# Patient Record
Sex: MALE | Race: WHITE | NOT HISPANIC OR LATINO | Employment: FULL TIME | URBAN - NONMETROPOLITAN AREA
[De-identification: names, ages, dates, MRNs, and addresses within clinical notes are randomized per-mention and may not be internally consistent; named-entity substitution may affect disease eponyms.]

---

## 2017-07-25 ENCOUNTER — TRANSCRIBE ORDERS (OUTPATIENT)
Dept: LAB | Facility: HOSPITAL | Age: 37
End: 2017-07-25

## 2017-07-25 ENCOUNTER — LAB (OUTPATIENT)
Dept: LAB | Facility: HOSPITAL | Age: 37
End: 2017-07-25

## 2017-07-25 DIAGNOSIS — R74.01 NONSPECIFIC ELEVATION OF LEVELS OF TRANSAMINASE OR LACTIC ACID DEHYDROGENASE (LDH): Primary | ICD-10-CM

## 2017-07-25 DIAGNOSIS — F40.00: ICD-10-CM

## 2017-07-25 DIAGNOSIS — R74.01 NONSPECIFIC ELEVATION OF LEVELS OF TRANSAMINASE OR LACTIC ACID DEHYDROGENASE (LDH): ICD-10-CM

## 2017-07-25 DIAGNOSIS — R74.02 NONSPECIFIC ELEVATION OF LEVELS OF TRANSAMINASE OR LACTIC ACID DEHYDROGENASE (LDH): Primary | ICD-10-CM

## 2017-07-25 DIAGNOSIS — E23.0 PANHYPOPITUITARISM (HCC): ICD-10-CM

## 2017-07-25 DIAGNOSIS — R74.02 NONSPECIFIC ELEVATION OF LEVELS OF TRANSAMINASE OR LACTIC ACID DEHYDROGENASE (LDH): ICD-10-CM

## 2017-07-25 PROCEDURE — 84403 ASSAY OF TOTAL TESTOSTERONE: CPT | Performed by: INTERNAL MEDICINE

## 2017-07-25 PROCEDURE — 84402 ASSAY OF FREE TESTOSTERONE: CPT | Performed by: INTERNAL MEDICINE

## 2017-07-25 PROCEDURE — 36415 COLL VENOUS BLD VENIPUNCTURE: CPT

## 2017-07-27 LAB
TESTOST FREE SERPL-MCNC: 5.7 PG/ML (ref 8.7–25.1)
TESTOST SERPL-MCNC: 306 NG/DL (ref 264–916)

## 2017-11-21 ENCOUNTER — APPOINTMENT (OUTPATIENT)
Dept: GENERAL RADIOLOGY | Facility: HOSPITAL | Age: 37
End: 2017-11-21

## 2017-11-21 ENCOUNTER — HOSPITAL ENCOUNTER (EMERGENCY)
Facility: HOSPITAL | Age: 37
Discharge: HOME | End: 2017-11-21
Attending: EMERGENCY MEDICINE | Admitting: EMERGENCY MEDICINE

## 2017-11-21 VITALS
HEART RATE: 82 BPM | OXYGEN SATURATION: 97 % | HEIGHT: 67 IN | RESPIRATION RATE: 18 BRPM | TEMPERATURE: 98.6 F | SYSTOLIC BLOOD PRESSURE: 118 MMHG | WEIGHT: 200 LBS | DIASTOLIC BLOOD PRESSURE: 61 MMHG | BODY MASS INDEX: 31.39 KG/M2

## 2017-11-21 DIAGNOSIS — R07.89 CHEST PAIN, ATYPICAL: ICD-10-CM

## 2017-11-21 DIAGNOSIS — R10.13 ABDOMINAL PAIN, ACUTE, EPIGASTRIC: Primary | ICD-10-CM

## 2017-11-21 DIAGNOSIS — R03.0 ELEVATED BLOOD PRESSURE READING: ICD-10-CM

## 2017-11-21 LAB
ALBUMIN SERPL-MCNC: 4.8 G/DL (ref 3.5–5)
ALBUMIN/GLOB SERPL: 1.5 G/DL (ref 1–2)
ALP SERPL-CCNC: 62 U/L (ref 38–126)
ALT SERPL W P-5'-P-CCNC: 74 U/L (ref 13–69)
ANION GAP SERPL CALCULATED.3IONS-SCNC: 15.5 MMOL/L
AST SERPL-CCNC: 44 U/L (ref 15–46)
BASOPHILS # BLD AUTO: 0.04 10*3/MM3 (ref 0–0.2)
BASOPHILS NFR BLD AUTO: 0.5 % (ref 0–2.5)
BILIRUB SERPL-MCNC: 0.3 MG/DL (ref 0.2–1.3)
BUN BLD-MCNC: 19 MG/DL (ref 7–20)
BUN/CREAT SERPL: 21.1 (ref 6.3–21.9)
CALCIUM SPEC-SCNC: 9.3 MG/DL (ref 8.4–10.2)
CHLORIDE SERPL-SCNC: 100 MMOL/L (ref 98–107)
CO2 SERPL-SCNC: 30 MMOL/L (ref 26–30)
CREAT BLD-MCNC: 0.9 MG/DL (ref 0.6–1.3)
DEPRECATED RDW RBC AUTO: 42.6 FL (ref 37–54)
EOSINOPHIL # BLD AUTO: 0.11 10*3/MM3 (ref 0–0.7)
EOSINOPHIL NFR BLD AUTO: 1.3 % (ref 0–7)
ERYTHROCYTE [DISTWIDTH] IN BLOOD BY AUTOMATED COUNT: 12.6 % (ref 11.5–14.5)
GFR SERPL CREATININE-BSD FRML MDRD: 96 ML/MIN/1.73
GLOBULIN UR ELPH-MCNC: 3.3 GM/DL
GLUCOSE BLD-MCNC: 144 MG/DL (ref 74–98)
HCT VFR BLD AUTO: 43.4 % (ref 42–52)
HGB BLD-MCNC: 14.2 G/DL (ref 14–18)
HOLD SPECIMEN: NORMAL
HOLD SPECIMEN: NORMAL
IMM GRANULOCYTES # BLD: 0.03 10*3/MM3 (ref 0–0.06)
IMM GRANULOCYTES NFR BLD: 0.4 % (ref 0–0.6)
LIPASE SERPL-CCNC: 53 U/L (ref 23–300)
LYMPHOCYTES # BLD AUTO: 2.88 10*3/MM3 (ref 0.6–3.4)
LYMPHOCYTES NFR BLD AUTO: 35.3 % (ref 10–50)
MCH RBC QN AUTO: 30.1 PG (ref 27–31)
MCHC RBC AUTO-ENTMCNC: 32.7 G/DL (ref 30–37)
MCV RBC AUTO: 92.1 FL (ref 80–94)
MONOCYTES # BLD AUTO: 0.71 10*3/MM3 (ref 0–0.9)
MONOCYTES NFR BLD AUTO: 8.7 % (ref 0–12)
NEUTROPHILS # BLD AUTO: 4.4 10*3/MM3 (ref 2–6.9)
NEUTROPHILS NFR BLD AUTO: 53.8 % (ref 37–80)
NRBC BLD MANUAL-RTO: 0 /100 WBC (ref 0–0)
PLATELET # BLD AUTO: 243 10*3/MM3 (ref 130–400)
PMV BLD AUTO: 9.8 FL (ref 6–12)
POTASSIUM BLD-SCNC: 3.5 MMOL/L (ref 3.5–5.1)
PROT SERPL-MCNC: 8.1 G/DL (ref 6.3–8.2)
RBC # BLD AUTO: 4.71 10*6/MM3 (ref 4.7–6.1)
SODIUM BLD-SCNC: 142 MMOL/L (ref 137–145)
TROPONIN I SERPL-MCNC: 0.01 NG/ML (ref 0–0.05)
TROPONIN I SERPL-MCNC: <0.012 NG/ML (ref 0–0.03)
TSH SERPL DL<=0.05 MIU/L-ACNC: 4.58 MIU/ML (ref 0.47–4.68)
WBC NRBC COR # BLD: 8.17 10*3/MM3 (ref 4.8–10.8)
WHOLE BLOOD HOLD SPECIMEN: NORMAL
WHOLE BLOOD HOLD SPECIMEN: NORMAL

## 2017-11-21 PROCEDURE — 84443 ASSAY THYROID STIM HORMONE: CPT | Performed by: PHYSICIAN ASSISTANT

## 2017-11-21 PROCEDURE — 83690 ASSAY OF LIPASE: CPT | Performed by: PHYSICIAN ASSISTANT

## 2017-11-21 PROCEDURE — 99284 EMERGENCY DEPT VISIT MOD MDM: CPT

## 2017-11-21 PROCEDURE — 85025 COMPLETE CBC W/AUTO DIFF WBC: CPT | Performed by: EMERGENCY MEDICINE

## 2017-11-21 PROCEDURE — 93005 ELECTROCARDIOGRAM TRACING: CPT

## 2017-11-21 PROCEDURE — 84484 ASSAY OF TROPONIN QUANT: CPT | Performed by: EMERGENCY MEDICINE

## 2017-11-21 PROCEDURE — 71010 HC CHEST PA OR AP: CPT

## 2017-11-21 PROCEDURE — 80053 COMPREHEN METABOLIC PANEL: CPT | Performed by: EMERGENCY MEDICINE

## 2017-11-21 RX ORDER — RANITIDINE 150 MG/1
150 TABLET ORAL 2 TIMES DAILY
Qty: 60 TABLET | Refills: 0 | Status: SHIPPED | OUTPATIENT
Start: 2017-11-21 | End: 2017-11-28

## 2017-11-21 RX ORDER — SODIUM CHLORIDE 0.9 % (FLUSH) 0.9 %
10 SYRINGE (ML) INJECTION AS NEEDED
Status: DISCONTINUED | OUTPATIENT
Start: 2017-11-21 | End: 2017-11-22 | Stop reason: HOSPADM

## 2017-11-21 RX ORDER — PANTOPRAZOLE SODIUM 40 MG/1
40 TABLET, DELAYED RELEASE ORAL ONCE
Status: COMPLETED | OUTPATIENT
Start: 2017-11-21 | End: 2017-11-21

## 2017-11-21 RX ORDER — ASPIRIN 325 MG
325 TABLET ORAL ONCE
Status: COMPLETED | OUTPATIENT
Start: 2017-11-21 | End: 2017-11-21

## 2017-11-21 RX ADMIN — PANTOPRAZOLE SODIUM 40 MG: 40 TABLET, DELAYED RELEASE ORAL at 22:45

## 2017-11-21 RX ADMIN — ASPIRIN 325 MG ORAL TABLET 325 MG: 325 PILL ORAL at 22:07

## 2017-11-22 NOTE — ED PROVIDER NOTES
Subjective   HPI Comments: 37-year-old male with a history of a hiatal hernia and acid reflux.  He is on no medications for this at home.  His only medication is Suboxone.    He is here with a one month history of intermittent hypertension with a blood pressure of 163/94 upon arrival here.  He is also here with a several-day history of progressively improving epigastric pressure or burning like discomfort radiating to his sternum that is gone currently.  Some associated nausea without vomiting or diarrhea.  No melena or hematochezia.  Also states that he had a episode of tachycardia 8-9 days ago while walking around outside.  No syncope.  No history DVT or PE.  No previous cardiac history.  He is concerned this might be his heart.    Aggravating factors: None.  Alleviating factors: None.  Treatment prior to arrival: None.    His cardiac risk factors include high blood pressure.  No diabetes mellitus, hyperlipidemia, family history of CAD or smoking.  No drug use other than Suboxone.      History provided by:  Patient  History limited by: nothing.   used: No        Review of Systems   Constitutional: Negative.    HENT: Negative.    Eyes: Negative.    Respiratory: Negative.    Cardiovascular: Positive for chest pain.   Gastrointestinal: Positive for abdominal pain and nausea.   Endocrine: Negative.    Genitourinary: Negative for dysuria.   Musculoskeletal: Negative.    Skin: Negative.    Allergic/Immunologic: Negative.    Neurological: Negative.    Hematological: Negative.    Psychiatric/Behavioral: Negative.    All other systems reviewed and are negative.      Past Medical History:   Diagnosis Date   • Hiatal hernia    • Hypertension        Allergies   Allergen Reactions   • Penicillins        Past Surgical History:   Procedure Laterality Date   • LEG SURGERY         History reviewed. No pertinent family history.    Social History     Social History   • Marital status:      Spouse name: N/A    • Number of children: N/A   • Years of education: N/A     Social History Main Topics   • Smoking status: Never Smoker   • Smokeless tobacco: None   • Alcohol use No   • Drug use: No   • Sexual activity: Not Asked     Other Topics Concern   • None     Social History Narrative   • None           Objective   Physical Exam   Constitutional: He is oriented to person, place, and time. He appears well-developed and well-nourished. No distress.   HENT:   Head: Normocephalic and atraumatic.   Right Ear: External ear normal.   Left Ear: External ear normal.   Eyes: EOM are normal. Pupils are equal, round, and reactive to light.   Neck: Normal range of motion. Neck supple.   Cardiovascular: Normal rate, regular rhythm and normal heart sounds.    No calf tenderness or ankle edema.   Pulmonary/Chest: Effort normal and breath sounds normal. No stridor. He has no wheezes. He exhibits no tenderness.   Abdominal: Soft. He exhibits no distension. There is no tenderness. There is no rebound and no guarding.   Musculoskeletal: Normal range of motion. He exhibits no edema.   Neurological: He is alert and oriented to person, place, and time.   Skin: Skin is warm and dry. No rash noted. He is not diaphoretic.   Psychiatric: He has a normal mood and affect. His behavior is normal. Judgment and thought content normal.   Nursing note and vitals reviewed.      ECG 12 Lead    Date/Time: 11/21/2017 11:09 PM  Performed by: BUBBA PARKER  Authorized by: SVETA PICHARDO   Comments: EKG: Sinus tachycardia at a rate of 111.  No ischemia or infarction.  Normal axis and intervals.  No ectopy.               ED Course  ED Course   Comment By Time   Vital signs noted.  The patient is not tachycardic and his O2 saturation is normal.  Therefore doubt pulmonary embolus.  We will treat for esophagitis with ranitidine.  Have him follow-up with his family doctor as soon as possible for workup of his elevated blood pressure, chest pain and sleep  apnea-the patient is concerned about sleep apnea. Bina Nielsen PA-C 11/21 2310                  MDM  Number of Diagnoses or Management Options  Abdominal pain, acute, epigastric: new and requires workup  Chest pain, atypical: new and requires workup  Elevated blood pressure reading: new and requires workup     Amount and/or Complexity of Data Reviewed  Clinical lab tests: reviewed and ordered  Tests in the radiology section of CPT®: ordered and reviewed  Tests in the medicine section of CPT®: ordered and reviewed  Independent visualization of images, tracings, or specimens: yes    Risk of Complications, Morbidity, and/or Mortality  Presenting problems: moderate  Diagnostic procedures: low  Management options: moderate  General comments: We will treat for GERD, have the patient follow up this family doctor tomorrow for possible blood pressure medication initiation.  He understands what  to return for as I explained this to him.  Negative laboratory evaluation, EKG, troponin and chest x-ray today.    Patient Progress  Patient progress: stable      Final diagnoses:   Abdominal pain, acute, epigastric   Chest pain, atypical   Elevated blood pressure reading            Bina Nielsen PA-C  11/21/17 [id number]

## 2017-11-22 NOTE — DISCHARGE INSTRUCTIONS
Return to the ER for markedly worsening abdominal pain, fever, vomiting blood, passing blood through your bowels or having black tarry stool, sustained heart racing, worsening chest pain, passing out, new or worsening symptoms.    Follow-up with your doctor tomorrow for further workup, treatment and evaluation.  They will also need to recheck your blood pressure and determine whether or not blood pressure medication is necessary.    If you're concerned about sleep apnea, your family doctor can work you up for this.  Call for appointment.

## 2017-11-28 ENCOUNTER — OFFICE VISIT (OUTPATIENT)
Dept: INTERNAL MEDICINE | Facility: CLINIC | Age: 37
End: 2017-11-28

## 2017-11-28 VITALS
SYSTOLIC BLOOD PRESSURE: 148 MMHG | HEART RATE: 78 BPM | BODY MASS INDEX: 32.65 KG/M2 | RESPIRATION RATE: 14 BRPM | WEIGHT: 208 LBS | DIASTOLIC BLOOD PRESSURE: 82 MMHG | HEIGHT: 67 IN | OXYGEN SATURATION: 98 % | TEMPERATURE: 97.5 F

## 2017-11-28 DIAGNOSIS — Z51.81 ENCOUNTER FOR MONITORING SUBOXONE MAINTENANCE THERAPY: ICD-10-CM

## 2017-11-28 DIAGNOSIS — Z79.899 ENCOUNTER FOR MONITORING SUBOXONE MAINTENANCE THERAPY: ICD-10-CM

## 2017-11-28 DIAGNOSIS — E66.9 CLASS 1 OBESITY WITHOUT SERIOUS COMORBIDITY WITH BODY MASS INDEX (BMI) OF 32.0 TO 32.9 IN ADULT, UNSPECIFIED OBESITY TYPE: ICD-10-CM

## 2017-11-28 DIAGNOSIS — E29.1 HYPOGONADISM IN MALE: ICD-10-CM

## 2017-11-28 DIAGNOSIS — R74.8 LIVER ENZYME ELEVATION: ICD-10-CM

## 2017-11-28 DIAGNOSIS — G47.30 SLEEP APNEA, UNSPECIFIED TYPE: Primary | ICD-10-CM

## 2017-11-28 DIAGNOSIS — I10 ESSENTIAL HYPERTENSION: ICD-10-CM

## 2017-11-28 PROBLEM — E66.811 CLASS 1 OBESITY WITHOUT SERIOUS COMORBIDITY WITH BODY MASS INDEX (BMI) OF 32.0 TO 32.9 IN ADULT: Status: ACTIVE | Noted: 2017-11-28

## 2017-11-28 PROBLEM — Z79.891 ENCOUNTER FOR MONITORING SUBOXONE MAINTENANCE THERAPY: Status: ACTIVE | Noted: 2017-11-28

## 2017-11-28 PROCEDURE — 99203 OFFICE O/P NEW LOW 30 MIN: CPT | Performed by: INTERNAL MEDICINE

## 2017-11-28 RX ORDER — HYDROCHLOROTHIAZIDE 25 MG/1
25 TABLET ORAL DAILY
Qty: 30 TABLET | Refills: 1 | Status: SHIPPED | OUTPATIENT
Start: 2017-11-28 | End: 2018-02-01 | Stop reason: SDUPTHER

## 2017-11-28 NOTE — PROGRESS NOTES
Subjective   Robbin Arteaga is a 37 y.o. male.     Chief Complaint   Patient presents with   • Establish Care     new pt - needs sleep study - possible high BP       History of Present Illness   Patient here to establish care.  Recent [facility] at blood pressure elevated.  Today blood pressure is 148/82.  Denies any chest pain short of breath headache.  Occasional blurry vision.  Patient also snore daytime sleepiness and weight BMI 32.6 in the obesity range.  Patient also had history of narcotic abuse now on Suboxone.  Hypogonadism patient is seeing endocrinologist for testosterone shot.  Liver enzyme also slightly elevated.  No current outpatient prescriptions on file.    The following portions of the patient's history were reviewed and updated as appropriate: allergies, current medications, past family history, past medical history, past social history, past surgical history and problem list.    Review of Systems   Constitutional: Negative.    Respiratory: Negative.    Cardiovascular: Negative.    Gastrointestinal: Negative.    Musculoskeletal: Negative.    Skin: Negative.    Neurological: Negative.    Psychiatric/Behavioral: Negative.        Objective   Physical Exam   Constitutional: He is oriented to person, place, and time. He appears well-nourished.   Neck: Neck supple.   Cardiovascular: Normal rate, regular rhythm and normal heart sounds.    Pulmonary/Chest: Effort normal and breath sounds normal.   Abdominal: Bowel sounds are normal.   Neurological: He is alert and oriented to person, place, and time.   Skin: Skin is warm.   Psychiatric: He has a normal mood and affect.       All tests have been reviewed.    Assessment/Plan   Diagnoses and all orders for this visit:    Sleep apnea, unspecified type do sleep study--    Essential hypertension start HCTZ--    Encounter for monitoring Suboxone maintenance therapy follow up with suboxone clinic--    Class 1 obesity without serious comorbidity with body mass  index (BMI) of 32.0 to 32.9 in adult, unspecified obesity type, diet    Hypogonadism in male, follow endo    Liver enzyme elevation  -     Hemoglobin A1c  -     Glucose, Fasting  -     ALT; Future

## 2017-12-03 ENCOUNTER — RESULTS ENCOUNTER (OUTPATIENT)
Dept: INTERNAL MEDICINE | Facility: CLINIC | Age: 37
End: 2017-12-03

## 2017-12-03 DIAGNOSIS — R74.8 LIVER ENZYME ELEVATION: ICD-10-CM

## 2018-02-01 ENCOUNTER — TELEPHONE (OUTPATIENT)
Dept: INTERNAL MEDICINE | Facility: CLINIC | Age: 38
End: 2018-02-01

## 2018-02-01 LAB
ALT SERPL-CCNC: 63 U/L (ref 13–69)
BUN SERPL-MCNC: 19 MG/DL (ref 7–20)
BUN/CREAT SERPL: 27.1 (ref 6.3–21.9)
CALCIUM SERPL-MCNC: 9.8 MG/DL (ref 8.4–10.2)
CHLORIDE SERPL-SCNC: 99 MMOL/L (ref 98–107)
CO2 SERPL-SCNC: 32 MMOL/L (ref 26–30)
CREAT SERPL-MCNC: 0.7 MG/DL (ref 0.6–1.3)
GFR SERPLBLD CREATININE-BSD FMLA CKD-EPI: 128 ML/MIN/1.73
GFR SERPLBLD CREATININE-BSD FMLA CKD-EPI: >150 ML/MIN/1.73
GLUCOSE SERPL-MCNC: 107 MG/DL (ref 74–98)
HBA1C MFR BLD: 5.7 %
POTASSIUM SERPL-SCNC: 4 MMOL/L (ref 3.5–5.1)
SODIUM SERPL-SCNC: 144 MMOL/L (ref 137–145)

## 2018-02-01 RX ORDER — HYDROCHLOROTHIAZIDE 25 MG/1
25 TABLET ORAL DAILY
Qty: 30 TABLET | Refills: 1 | Status: SHIPPED | OUTPATIENT
Start: 2018-02-01 | End: 2018-02-15 | Stop reason: SDUPTHER

## 2018-02-15 ENCOUNTER — OFFICE VISIT (OUTPATIENT)
Dept: INTERNAL MEDICINE | Facility: CLINIC | Age: 38
End: 2018-02-15

## 2018-02-15 VITALS
BODY MASS INDEX: 30.29 KG/M2 | WEIGHT: 193 LBS | HEART RATE: 74 BPM | SYSTOLIC BLOOD PRESSURE: 138 MMHG | DIASTOLIC BLOOD PRESSURE: 70 MMHG | TEMPERATURE: 97.8 F | HEIGHT: 67 IN | OXYGEN SATURATION: 95 % | RESPIRATION RATE: 14 BRPM

## 2018-02-15 DIAGNOSIS — R74.8 LIVER ENZYME ELEVATION: ICD-10-CM

## 2018-02-15 DIAGNOSIS — I10 ESSENTIAL HYPERTENSION: Primary | ICD-10-CM

## 2018-02-15 DIAGNOSIS — Z79.899 ENCOUNTER FOR MONITORING SUBOXONE MAINTENANCE THERAPY: ICD-10-CM

## 2018-02-15 DIAGNOSIS — E66.9 CLASS 1 OBESITY WITHOUT SERIOUS COMORBIDITY WITH BODY MASS INDEX (BMI) OF 32.0 TO 32.9 IN ADULT, UNSPECIFIED OBESITY TYPE: ICD-10-CM

## 2018-02-15 DIAGNOSIS — Z51.81 ENCOUNTER FOR MONITORING SUBOXONE MAINTENANCE THERAPY: ICD-10-CM

## 2018-02-15 DIAGNOSIS — E29.1 HYPOGONADISM IN MALE: ICD-10-CM

## 2018-02-15 DIAGNOSIS — G47.30 SLEEP APNEA, UNSPECIFIED TYPE: ICD-10-CM

## 2018-02-15 PROCEDURE — 99214 OFFICE O/P EST MOD 30 MIN: CPT | Performed by: INTERNAL MEDICINE

## 2018-02-15 RX ORDER — FLUOXETINE 10 MG/1
10 CAPSULE ORAL DAILY
Qty: 30 CAPSULE | Refills: 3 | Status: SHIPPED | OUTPATIENT
Start: 2018-02-15 | End: 2019-10-28

## 2018-02-15 RX ORDER — HYDROCHLOROTHIAZIDE 25 MG/1
25 TABLET ORAL DAILY
Qty: 30 TABLET | Refills: 5 | Status: SHIPPED | OUTPATIENT
Start: 2018-02-15 | End: 2019-10-28

## 2018-02-15 RX ORDER — BUPRENORPHINE HYDROCHLORIDE, NALOXONE HYDROCHLORIDE 8; 2 MG/1; MG/1
FILM, SOLUBLE BUCCAL; SUBLINGUAL
Refills: 0 | COMMUNITY
Start: 2018-01-29

## 2018-02-15 NOTE — PROGRESS NOTES
Subjective   Robbin Arteaga is a 37 y.o. male.     Chief Complaint   Patient presents with   • Hypertension     follow up on BP       History of Present Illness   Patient here for follow-up.  Hypertension stable medication.  Weight loss 15 pound on purpose.  Anxious nervous from time to time.  Hypogonadism patient discontinued testosterone and anxiety is better now.  Liver enzyme returned to normal after weight loss.  Sugar slightly elevated.    Current Outpatient Prescriptions:   •  hydrochlorothiazide (HYDRODIURIL) 25 MG tablet, Take 1 tablet by mouth Daily., Disp: 30 tablet, Rfl: 5  •  SUBOXONE 8-2 MG film film, take 2 FILMs under the tongue once daily, Disp: , Rfl: 0    The following portions of the patient's history were reviewed and updated as appropriate: allergies, current medications, past family history, past medical history, past social history, past surgical history and problem list.    Review of Systems   Constitutional: Negative.    Respiratory: Negative.    Cardiovascular: Negative.    Gastrointestinal: Negative.    Musculoskeletal: Negative.    Skin: Negative.    Neurological: Negative.    Psychiatric/Behavioral: Negative.        Objective   Physical Exam   Constitutional: He is oriented to person, place, and time. He appears well-nourished.   Neck: Neck supple.   Cardiovascular: Normal rate, regular rhythm and normal heart sounds.    Pulmonary/Chest: Effort normal and breath sounds normal.   Abdominal: Bowel sounds are normal.   Neurological: He is alert and oriented to person, place, and time.   Skin: Skin is warm.   Psychiatric: He has a normal mood and affect.       All tests have been reviewed.    Assessment/Plan   There are no diagnoses linked to this encounter.          Sleep apnea,  do sleep study--     Essential hypertension continue HCTZ--     Encounter for monitoring Suboxone maintenance therapy tapering follow up with suboxone clinic--     Class 1 obesity weight loss 15 Ib on  purpose    Anxiety, prozac start     Hypogonadism in male, follow endo     Liver enzyme elevation resolved after weight loss    Hyperglycemia continue diet

## 2018-04-05 ENCOUNTER — OFFICE VISIT (OUTPATIENT)
Dept: INTERNAL MEDICINE | Facility: CLINIC | Age: 38
End: 2018-04-05

## 2018-04-05 VITALS
DIASTOLIC BLOOD PRESSURE: 68 MMHG | BODY MASS INDEX: 29.66 KG/M2 | HEIGHT: 67 IN | OXYGEN SATURATION: 98 % | HEART RATE: 80 BPM | TEMPERATURE: 97.8 F | SYSTOLIC BLOOD PRESSURE: 140 MMHG | RESPIRATION RATE: 14 BRPM | WEIGHT: 189 LBS

## 2018-04-05 DIAGNOSIS — F41.9 ANXIETY: ICD-10-CM

## 2018-04-05 DIAGNOSIS — Z51.81 ENCOUNTER FOR MONITORING SUBOXONE MAINTENANCE THERAPY: ICD-10-CM

## 2018-04-05 DIAGNOSIS — E29.1 HYPOGONADISM IN MALE: ICD-10-CM

## 2018-04-05 DIAGNOSIS — R39.198 DIFFICULTY IN URINATION: Primary | ICD-10-CM

## 2018-04-05 DIAGNOSIS — I10 ESSENTIAL HYPERTENSION: ICD-10-CM

## 2018-04-05 DIAGNOSIS — Z79.899 ENCOUNTER FOR MONITORING SUBOXONE MAINTENANCE THERAPY: ICD-10-CM

## 2018-04-05 DIAGNOSIS — G47.30 SLEEP APNEA, UNSPECIFIED TYPE: ICD-10-CM

## 2018-04-05 PROBLEM — E66.9 CLASS 1 OBESITY WITHOUT SERIOUS COMORBIDITY WITH BODY MASS INDEX (BMI) OF 32.0 TO 32.9 IN ADULT: Status: RESOLVED | Noted: 2017-11-28 | Resolved: 2018-04-05

## 2018-04-05 PROBLEM — R74.8 LIVER ENZYME ELEVATION: Status: RESOLVED | Noted: 2017-11-28 | Resolved: 2018-04-05

## 2018-04-05 PROBLEM — E66.811 CLASS 1 OBESITY WITHOUT SERIOUS COMORBIDITY WITH BODY MASS INDEX (BMI) OF 32.0 TO 32.9 IN ADULT: Status: RESOLVED | Noted: 2017-11-28 | Resolved: 2018-04-05

## 2018-04-05 PROCEDURE — 99214 OFFICE O/P EST MOD 30 MIN: CPT | Performed by: INTERNAL MEDICINE

## 2018-04-05 NOTE — PROGRESS NOTES
Subjective   Robbin Arteaga is a 37 y.o. male.     Chief Complaint   Patient presents with   • Follow-up   • Med Refill       History of Present Illness   Patient here for follow-up.  Sleep apnea patient yet to do sleep study.  Patient also complains weak urine stream.  Blood pressure slightly elevated at.  Patient still taking Suboxone.  Weight loss another 4 pound on purpose.  Anxiety medication helped some.  Hypogonadism patient is self discontinued testosterone due to panic attack and abnormal liver enzymes.  Sugar is also elevated.    Current Outpatient Prescriptions:   •  FLUoxetine (PROZAC) 10 MG capsule, Take 1 capsule by mouth Daily., Disp: 30 capsule, Rfl: 3  •  hydrochlorothiazide (HYDRODIURIL) 25 MG tablet, Take 1 tablet by mouth Daily., Disp: 30 tablet, Rfl: 5  •  SUBOXONE 8-2 MG film film, take 2 FILMs under the tongue once daily, Disp: , Rfl: 0    The following portions of the patient's history were reviewed and updated as appropriate: allergies, current medications, past family history, past medical history, past social history, past surgical history and problem list.    Review of Systems   Constitutional: Negative.    Respiratory: Negative.    Cardiovascular: Negative.    Gastrointestinal: Negative.    Musculoskeletal: Negative.    Skin: Negative.    Neurological: Negative.    Psychiatric/Behavioral: Negative.        Objective   Physical Exam   Constitutional: He is oriented to person, place, and time. He appears well-nourished.   Neck: Neck supple.   Cardiovascular: Normal rate, regular rhythm and normal heart sounds.    Pulmonary/Chest: Effort normal and breath sounds normal.   Abdominal: Bowel sounds are normal.   Neurological: He is alert and oriented to person, place, and time.   Skin: Skin is warm.   Psychiatric: He has a normal mood and affect.       All tests have been reviewed.    Assessment/Plan   There are no diagnoses linked to this encounter.          Sleep apnea,  pending to do  sleep study--  Weak urine do UA     Essential hypertension continue HCTZ--     Encounter for monitoring Suboxone maintenance therapy tapering follow up with suboxone clinic--     Class 1 obesity weight loss 4 Ib on purpose     Anxiety, prozac continue     Hypogonadism in male, follow endo, patient declines testo due to panic attack      Liver enzyme elevation resolved after weight loss     Hyperglycemia continue diet     3 mo PE after labs

## 2018-07-23 ENCOUNTER — HOSPITAL ENCOUNTER (EMERGENCY)
Facility: HOSPITAL | Age: 38
Discharge: HOME | End: 2018-07-23
Attending: STUDENT IN AN ORGANIZED HEALTH CARE EDUCATION/TRAINING PROGRAM | Admitting: STUDENT IN AN ORGANIZED HEALTH CARE EDUCATION/TRAINING PROGRAM

## 2018-07-23 ENCOUNTER — APPOINTMENT (OUTPATIENT)
Dept: GENERAL RADIOLOGY | Facility: HOSPITAL | Age: 38
End: 2018-07-23

## 2018-07-23 VITALS
HEART RATE: 63 BPM | BODY MASS INDEX: 27.47 KG/M2 | DIASTOLIC BLOOD PRESSURE: 80 MMHG | TEMPERATURE: 98.7 F | OXYGEN SATURATION: 98 % | SYSTOLIC BLOOD PRESSURE: 133 MMHG | HEIGHT: 67 IN | RESPIRATION RATE: 14 BRPM | WEIGHT: 175 LBS

## 2018-07-23 DIAGNOSIS — R07.89 CHEST PRESSURE: Primary | ICD-10-CM

## 2018-07-23 DIAGNOSIS — R00.2 HEART PALPITATIONS: ICD-10-CM

## 2018-07-23 LAB
ALBUMIN SERPL-MCNC: 4.6 G/DL (ref 3.5–5)
ALBUMIN/GLOB SERPL: 1.3 G/DL (ref 1–2)
ALP SERPL-CCNC: 63 U/L (ref 38–126)
ALT SERPL W P-5'-P-CCNC: 63 U/L (ref 13–69)
ANION GAP SERPL CALCULATED.3IONS-SCNC: 13.1 MMOL/L (ref 10–20)
AST SERPL-CCNC: 32 U/L (ref 15–46)
BASOPHILS # BLD AUTO: 0.03 10*3/MM3 (ref 0–0.2)
BASOPHILS NFR BLD AUTO: 0.6 % (ref 0–2.5)
BILIRUB SERPL-MCNC: 0.6 MG/DL (ref 0.2–1.3)
BUN BLD-MCNC: 22 MG/DL (ref 7–20)
BUN/CREAT SERPL: 31.4 (ref 6.3–21.9)
CALCIUM SPEC-SCNC: 9.9 MG/DL (ref 8.4–10.2)
CHLORIDE SERPL-SCNC: 98 MMOL/L (ref 98–107)
CO2 SERPL-SCNC: 33 MMOL/L (ref 26–30)
CREAT BLD-MCNC: 0.7 MG/DL (ref 0.6–1.3)
DEPRECATED RDW RBC AUTO: 37.1 FL (ref 37–54)
EOSINOPHIL # BLD AUTO: 0.06 10*3/MM3 (ref 0–0.7)
EOSINOPHIL NFR BLD AUTO: 1.2 % (ref 0–7)
ERYTHROCYTE [DISTWIDTH] IN BLOOD BY AUTOMATED COUNT: 11.6 % (ref 11.5–14.5)
GFR SERPL CREATININE-BSD FRML MDRD: 128 ML/MIN/1.73
GLOBULIN UR ELPH-MCNC: 3.5 GM/DL
GLUCOSE BLD-MCNC: 105 MG/DL (ref 74–98)
HCT VFR BLD AUTO: 43.5 % (ref 42–52)
HGB BLD-MCNC: 15.2 G/DL (ref 14–18)
HOLD SPECIMEN: NORMAL
HOLD SPECIMEN: NORMAL
IMM GRANULOCYTES # BLD: 0.02 10*3/MM3 (ref 0–0.06)
IMM GRANULOCYTES NFR BLD: 0.4 % (ref 0–0.6)
LYMPHOCYTES # BLD AUTO: 0.91 10*3/MM3 (ref 0.6–3.4)
LYMPHOCYTES NFR BLD AUTO: 18.3 % (ref 10–50)
MCH RBC QN AUTO: 30.8 PG (ref 27–31)
MCHC RBC AUTO-ENTMCNC: 34.9 G/DL (ref 30–37)
MCV RBC AUTO: 88.1 FL (ref 80–94)
MONOCYTES # BLD AUTO: 0.33 10*3/MM3 (ref 0–0.9)
MONOCYTES NFR BLD AUTO: 6.7 % (ref 0–12)
NEUTROPHILS # BLD AUTO: 3.61 10*3/MM3 (ref 2–6.9)
NEUTROPHILS NFR BLD AUTO: 72.8 % (ref 37–80)
NRBC BLD MANUAL-RTO: 0 /100 WBC (ref 0–0)
PLATELET # BLD AUTO: 185 10*3/MM3 (ref 130–400)
PMV BLD AUTO: 9.1 FL (ref 6–12)
POTASSIUM BLD-SCNC: 4.1 MMOL/L (ref 3.5–5.1)
PROT SERPL-MCNC: 8.1 G/DL (ref 6.3–8.2)
RBC # BLD AUTO: 4.94 10*6/MM3 (ref 4.7–6.1)
SODIUM BLD-SCNC: 140 MMOL/L (ref 137–145)
TROPONIN I SERPL-MCNC: 0 NG/ML (ref 0–0.05)
TROPONIN I SERPL-MCNC: <0.012 NG/ML (ref 0–0.03)
WBC NRBC COR # BLD: 4.96 10*3/MM3 (ref 4.8–10.8)
WHOLE BLOOD HOLD SPECIMEN: NORMAL
WHOLE BLOOD HOLD SPECIMEN: NORMAL

## 2018-07-23 PROCEDURE — 71045 X-RAY EXAM CHEST 1 VIEW: CPT

## 2018-07-23 PROCEDURE — 80053 COMPREHEN METABOLIC PANEL: CPT | Performed by: STUDENT IN AN ORGANIZED HEALTH CARE EDUCATION/TRAINING PROGRAM

## 2018-07-23 PROCEDURE — 93005 ELECTROCARDIOGRAM TRACING: CPT | Performed by: STUDENT IN AN ORGANIZED HEALTH CARE EDUCATION/TRAINING PROGRAM

## 2018-07-23 PROCEDURE — 99284 EMERGENCY DEPT VISIT MOD MDM: CPT

## 2018-07-23 PROCEDURE — 84484 ASSAY OF TROPONIN QUANT: CPT | Performed by: STUDENT IN AN ORGANIZED HEALTH CARE EDUCATION/TRAINING PROGRAM

## 2018-07-23 PROCEDURE — 85025 COMPLETE CBC W/AUTO DIFF WBC: CPT | Performed by: STUDENT IN AN ORGANIZED HEALTH CARE EDUCATION/TRAINING PROGRAM

## 2018-07-23 RX ORDER — ASPIRIN 325 MG
325 TABLET ORAL ONCE
Status: COMPLETED | OUTPATIENT
Start: 2018-07-23 | End: 2018-07-23

## 2018-07-23 RX ORDER — SODIUM CHLORIDE 0.9 % (FLUSH) 0.9 %
10 SYRINGE (ML) INJECTION AS NEEDED
Status: DISCONTINUED | OUTPATIENT
Start: 2018-07-23 | End: 2018-07-23 | Stop reason: HOSPADM

## 2018-07-23 RX ADMIN — ASPIRIN 325 MG ORAL TABLET 325 MG: 325 PILL ORAL at 07:39

## 2019-10-28 ENCOUNTER — OFFICE VISIT (OUTPATIENT)
Dept: INTERNAL MEDICINE | Facility: CLINIC | Age: 39
End: 2019-10-28

## 2019-10-28 VITALS
TEMPERATURE: 98 F | OXYGEN SATURATION: 97 % | DIASTOLIC BLOOD PRESSURE: 82 MMHG | SYSTOLIC BLOOD PRESSURE: 142 MMHG | HEIGHT: 67 IN | WEIGHT: 193 LBS | RESPIRATION RATE: 18 BRPM | BODY MASS INDEX: 30.29 KG/M2 | HEART RATE: 104 BPM

## 2019-10-28 DIAGNOSIS — G47.30 SLEEP APNEA, UNSPECIFIED TYPE: ICD-10-CM

## 2019-10-28 DIAGNOSIS — R10.9 FLANK PAIN: Primary | ICD-10-CM

## 2019-10-28 LAB
BILIRUB BLD-MCNC: NEGATIVE MG/DL
CLARITY, POC: CLEAR
COLOR UR: YELLOW
GLUCOSE UR STRIP-MCNC: NEGATIVE MG/DL
KETONES UR QL: NEGATIVE
LEUKOCYTE EST, POC: NEGATIVE
NITRITE UR-MCNC: NEGATIVE MG/ML
PH UR: 7 [PH] (ref 5–8)
PROT UR STRIP-MCNC: NEGATIVE MG/DL
RBC # UR STRIP: NEGATIVE /UL
SP GR UR: 1.01 (ref 1–1.03)
UROBILINOGEN UR QL: NORMAL

## 2019-10-28 PROCEDURE — 99214 OFFICE O/P EST MOD 30 MIN: CPT | Performed by: INTERNAL MEDICINE

## 2019-10-28 PROCEDURE — 81003 URINALYSIS AUTO W/O SCOPE: CPT | Performed by: INTERNAL MEDICINE

## 2019-10-28 RX ORDER — BACLOFEN 10 MG/1
10 TABLET ORAL 3 TIMES DAILY
Qty: 30 TABLET | Refills: 1 | Status: SHIPPED | OUTPATIENT
Start: 2019-10-28 | End: 2020-06-22

## 2019-10-28 NOTE — PROGRESS NOTES
Subjective   Robbin Arteaga is a 39 y.o. male.     Chief Complaint   Patient presents with   • Flank Pain     Pt states that he is having kidney problems and in his left lower back it hurts. Pt states he does wake up at night and need to use the restroom    • Foot Swelling     Pt states that his right foot is swelling and he would like to discuss about getting labs to check his sugar        History of Present Illness   Mid back pain off and on for 6 mo , long standing will make it worse. Some waking up pain. , no urinary sx, no freq , urgency, or dysuria. On suboxen now for hx of drug use.   Also snore and waking up in the mid night smuthering. Some time left foot swelling off and on and none now.     Current Outpatient Medications:   •  SUBOXONE 8-2 MG film film, take 2 FILMs under the tongue once daily, Disp: , Rfl: 0    The following portions of the patient's history were reviewed and updated as appropriate: allergies, current medications, past family history, past medical history, past social history, past surgical history and problem list.    Review of Systems   Constitutional: Negative.    Respiratory: Negative.    Cardiovascular: Positive for leg swelling (occa).   Gastrointestinal: Negative.    Musculoskeletal: Positive for back pain.   Skin: Negative.    Neurological: Negative.         Snore    Psychiatric/Behavioral: Negative.        Objective   Physical Exam   Constitutional: He is oriented to person, place, and time. He appears well-developed and well-nourished.   Neck: Neck supple.   Cardiovascular: Normal rate, regular rhythm and normal heart sounds.   Pulmonary/Chest: Effort normal and breath sounds normal.   Abdominal: Bowel sounds are normal.   Musculoskeletal: He exhibits no edema or tenderness.   Neurological: He is alert and oriented to person, place, and time.   Skin: Skin is warm.   Psychiatric: He has a normal mood and affect.       All tests have been reviewed.    Assessment/Plan    Diagnoses and all orders for this visit:    Flank pain  -     POCT urinalysis dipstick, automated, baclofen start and aleve     Sleep apnea, unspecified type  -     Ambulatory Referral to Pulmonology      Edema left foot none now    1 mo PE

## 2019-12-10 ENCOUNTER — OFFICE VISIT (OUTPATIENT)
Dept: PULMONOLOGY | Facility: CLINIC | Age: 39
End: 2019-12-10

## 2019-12-10 VITALS
BODY MASS INDEX: 30.61 KG/M2 | WEIGHT: 195 LBS | HEART RATE: 93 BPM | OXYGEN SATURATION: 98 % | DIASTOLIC BLOOD PRESSURE: 78 MMHG | RESPIRATION RATE: 16 BRPM | HEIGHT: 67 IN | SYSTOLIC BLOOD PRESSURE: 128 MMHG

## 2019-12-10 DIAGNOSIS — G47.19 EXCESSIVE DAYTIME SLEEPINESS: ICD-10-CM

## 2019-12-10 DIAGNOSIS — R06.83 SNORING: ICD-10-CM

## 2019-12-10 DIAGNOSIS — G47.33 OBSTRUCTIVE SLEEP APNEA: Primary | ICD-10-CM

## 2019-12-10 DIAGNOSIS — E66.9 OBESITY (BMI 30-39.9): ICD-10-CM

## 2019-12-10 PROCEDURE — 99244 OFF/OP CNSLTJ NEW/EST MOD 40: CPT | Performed by: INTERNAL MEDICINE

## 2019-12-10 NOTE — PROGRESS NOTES
"CONSULT NOTE    Requested by:   Maurisio Katz MD      Chief Complaint   Patient presents with   • Consult   • Sleeping Problem       Subjective:  Robbin Arteaga is a 39 y.o. male.     History of Present Illness   Patient was sent in today for evaluation of sleep disturbance. Patient says that for the past few years, he has had trouble with snoring. Patient has also been told that he sometimes quits breathing at night.       Patient says that he feels tired in the morning after waking up. he is also complaining of usually feeling sleepy while watching TV and sometimes while reading a book.    The patient has been told that he occasionally talks in his sleep.     he is not complaining of occasional headaches.    Patient's sleep schedule was reviewed. he drinks 2-3 cups/cans of caffeinated drinks per day.     he does not have a family history of NICOLASA.     Patient is on Suboxone and has been on it for 5-6 years.        The following portions of the patient's history were reviewed and updated as appropriate: allergies, current medications, past family history, past medical history, past social history and past surgical history.    Review of Systems   Constitutional: Negative for chills, fatigue and fever.   HENT: Negative for sinus pressure, sneezing and sore throat.    Respiratory: Negative for cough, chest tightness, shortness of breath and wheezing.    Cardiovascular: Negative for palpitations and leg swelling.   Psychiatric/Behavioral: Positive for sleep disturbance.   All other systems reviewed and are negative.      Past Medical History:   Diagnosis Date   • Hiatal hernia    • Hypertension        Social History     Tobacco Use   • Smoking status: Former Smoker     Years: 5.00   • Smokeless tobacco: Never Used   Substance Use Topics   • Alcohol use: No         Objective:  Visit Vitals  /78   Pulse 93   Resp 16   Ht 170.2 cm (67.01\")   Wt 88.5 kg (195 lb)   SpO2 98%   BMI 30.53 kg/m²       Physical Exam "   Constitutional: He is oriented to person, place, and time. He appears well-developed and well-nourished.   HENT:   Head: Atraumatic.   Crowded Oropharynx.    Eyes: Pupils are equal, round, and reactive to light. EOM are normal.   Neck: No JVD present. No tracheal deviation present. No thyromegaly present.   Cardiovascular: Normal rate and regular rhythm.   Pulmonary/Chest: Effort normal and breath sounds normal. No respiratory distress. He has no wheezes.   Musculoskeletal: Normal range of motion. He exhibits no edema.   Gait was normal.   Neurological: He is alert and oriented to person, place, and time.   Skin: Skin is warm and dry.   Psychiatric: He has a normal mood and affect. His behavior is normal.   Vitals reviewed.      Assessment/Plan:  Robbin was seen today for consult and sleeping problem.    Diagnoses and all orders for this visit:    Obstructive sleep apnea    Snoring    Excessive daytime sleepiness    Obesity (BMI 30-39.9)      Return for Give Sleep quest, Grant/Laurie, ....Also 7-8 mths w/ Dr. Singleton.    DISCUSSION(if any):  Laboratory workup was also reviewed which showed   Lab Results   Component Value Date    CO2 33.0 (H) 07/23/2018     ===========================  ===========================    Sleep questionnaire was provided to the patient    The pathophysiology of sleep apnea was discussed, with the patient.     We will encourage him to schedule the sleep study soon.     The patient was made aware of the limitation of the home sleep study, whereby it may underestimate the true AHI and also carries a low sensitivity.  I have informed him that even if the home sleep study is negative, we may suggest an in lab sleep study to completely and definitively rule out/in sleep apnea.  The patient has understood.  This was communicated to the patient, in case home study is to be requested.    The patient is agreeable to try CPAP/BiPAP, if needed.     Patient was educated on good sleep hygiene  measures and voiced understanding of the same.     Patient was given reading material regarding sleep apnea    Patient was counseled regarding weight loss.       Dictated utilizing Dragon dictation.    This document was electronically signed by Hemanth Singleton MD on 12/10/19 at 10:43 AM

## 2020-01-27 DIAGNOSIS — G47.33 OBSTRUCTIVE SLEEP APNEA: Primary | ICD-10-CM

## 2020-02-05 ENCOUNTER — HOSPITAL ENCOUNTER (OUTPATIENT)
Dept: SLEEP MEDICINE | Facility: HOSPITAL | Age: 40
Discharge: HOME | End: 2020-02-05
Admitting: INTERNAL MEDICINE

## 2020-02-05 DIAGNOSIS — G47.33 OBSTRUCTIVE SLEEP APNEA: ICD-10-CM

## 2020-02-05 PROCEDURE — 95806 SLEEP STUDY UNATT&RESP EFFT: CPT

## 2020-02-09 PROCEDURE — 95806 SLEEP STUDY UNATT&RESP EFFT: CPT | Performed by: INTERNAL MEDICINE

## 2020-02-25 DIAGNOSIS — G47.33 OSA (OBSTRUCTIVE SLEEP APNEA): Primary | ICD-10-CM

## 2020-05-14 ENCOUNTER — OFFICE VISIT (OUTPATIENT)
Dept: PULMONOLOGY | Facility: CLINIC | Age: 40
End: 2020-05-14

## 2020-05-14 DIAGNOSIS — G47.19 EXCESSIVE DAYTIME SLEEPINESS: ICD-10-CM

## 2020-05-14 DIAGNOSIS — G47.33 OSA (OBSTRUCTIVE SLEEP APNEA): Primary | ICD-10-CM

## 2020-05-14 PROCEDURE — 99442 PR PHYS/QHP TELEPHONE EVALUATION 11-20 MIN: CPT | Performed by: NURSE PRACTITIONER

## 2020-05-14 NOTE — PROGRESS NOTES
You have chosen to receive care through a telephone visit. Do you consent to use a telephone visit for your medical care today? Yes    Chief Complaint   Patient presents with   • Follow-up   • Sleeping Problem       Subjective   Robbin Arteaga is a 39 y.o. male.     History of Present Illness   The patient comes in today for follow-up of obstructive sleep apnea.    I reviewed his sleep study and discussed the results with him today.  The sleep study revealed moderate sleep apnea with an apnea hypopnea index of 19 per hour.      He has been set up with AutoPAP at a pressure of 5/15.  he is not having any issues using the machine.  He is feeling more rested and overall feeling better.  He is not waking during the night feeling like he may have a heart attack anymore.  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  He states he struggled with using the machine and putting a mask on his face at first but he has become much more consistent and able to use it for longer periods during the night.    The following portions of the patient's history were reviewed and updated as appropriate: allergies, current medications, past family history, past medical history, past social history and past surgical history.    Review of Systems   HENT: Negative for sinus pressure, sneezing and sore throat.    Respiratory: Negative for cough, shortness of breath and wheezing.    Psychiatric/Behavioral: Positive for sleep disturbance.       Objective     Physical Exam  This was a remote visit. Physical exam not performed.       Assessment/Plan   Robbin was seen today for follow-up and sleeping problem.    Diagnoses and all orders for this visit:    NICOLASA (obstructive sleep apnea)    Excessive daytime sleepiness           Return in about 5 months (around 10/14/2020) for Recheck, For Dr. Singleton, cancel appt in July, Sleep ONLY.    DISCUSSION (if any):  Continue treatment with  AutoPAP at a pressure of 5/15, with an under the nose nasal mask.    Patient's compliance data was reviewed and the compliance is at 70%.  He is only had the machine about 3 weeks so I think this compliance report is counting some days when the patient had not yet received the machine.  I feel he is at 70% compliance and looking at his report.  He does take the mask off some during his sleep but he states that seems to be getting better.    Humidification setup, hose and mask care discussed.    Weight loss advised.    Use every night for atleast 4 hours stressed.      This visit has been rescheduled as a telehealth/video visit to comply with patient safety concerns in accordance with CDC recommendations. Total time of discussion was 13 minutes.    Dictated utilizing Dragon dictation.    This document was electronically signed by DEBBI Clement on 05/14/20 at 15:32

## 2020-06-22 ENCOUNTER — OFFICE VISIT (OUTPATIENT)
Dept: INTERNAL MEDICINE | Facility: CLINIC | Age: 40
End: 2020-06-22

## 2020-06-22 VITALS
SYSTOLIC BLOOD PRESSURE: 146 MMHG | BODY MASS INDEX: 31.08 KG/M2 | RESPIRATION RATE: 16 BRPM | TEMPERATURE: 97.3 F | WEIGHT: 198 LBS | DIASTOLIC BLOOD PRESSURE: 92 MMHG | HEART RATE: 88 BPM | OXYGEN SATURATION: 98 % | HEIGHT: 67 IN

## 2020-06-22 DIAGNOSIS — R07.89 CHEST DISCOMFORT: ICD-10-CM

## 2020-06-22 DIAGNOSIS — G89.29 CHRONIC LEFT SHOULDER PAIN: ICD-10-CM

## 2020-06-22 DIAGNOSIS — I10 ESSENTIAL HYPERTENSION: Primary | ICD-10-CM

## 2020-06-22 DIAGNOSIS — E66.9 CLASS 1 OBESITY WITHOUT SERIOUS COMORBIDITY WITH BODY MASS INDEX (BMI) OF 31.0 TO 31.9 IN ADULT, UNSPECIFIED OBESITY TYPE: ICD-10-CM

## 2020-06-22 DIAGNOSIS — F41.9 ANXIETY: ICD-10-CM

## 2020-06-22 DIAGNOSIS — R11.0 NAUSEA: ICD-10-CM

## 2020-06-22 DIAGNOSIS — M25.512 CHRONIC LEFT SHOULDER PAIN: ICD-10-CM

## 2020-06-22 DIAGNOSIS — Z86.39 HISTORY OF HYPOGONADISM: ICD-10-CM

## 2020-06-22 DIAGNOSIS — M25.472 LEFT ANKLE SWELLING: ICD-10-CM

## 2020-06-22 PROCEDURE — 93000 ELECTROCARDIOGRAM COMPLETE: CPT | Performed by: INTERNAL MEDICINE

## 2020-06-22 PROCEDURE — 99214 OFFICE O/P EST MOD 30 MIN: CPT | Performed by: INTERNAL MEDICINE

## 2020-06-22 NOTE — PROGRESS NOTES
"Subjective   Robbin Arteaga is a 39 y.o. male.     Chief Complaint   Patient presents with   • Joint Swelling     Pt c/o left ankle swelling   • Palpitations     Pt states that he has \"little sensations\" in his chest that comes and goes        History of Present Illness   Left ankle edema for 6 mo , no hx of injury. ROM normal, eating soda, sit a lot . C/o left chest tingling sensation, no palpitation, no skip beats, mild nausea yesterday. No vomit or diarrhea , BP high today and also weight gainPatient here with multiple complaints.  Blood pressure slightly elevated.  Also complains of left ankle swelling.  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  Also weight gain recently.  Patient complains left chest occasional tingling sensation.  No dizziness no palpitation no pressure chest pain no skipped beats no radiation pain.  Yesterday has some nauseousness.  Also complains of left shoulder pain.  History of a hypogonadism and anxiety.  Unable to use testosterone due to panic attack.  Sleep apnea on CPAP but improved.  Self discontinued the anxiety medicine and the blood pressure medicine.  Patient does have a high sugar    Current Outpatient Medications:   •  SUBOXONE 8-2 MG film film, take 2 FILMs under the tongue once daily, Disp: , Rfl: 0    The following portions of the patient's history were reviewed and updated as appropriate: allergies, current medications, past family history, past medical history, past social history, past surgical history and problem list.    Review of Systems   Constitutional: Negative.    Respiratory: Negative.    Cardiovascular: Positive for leg swelling.        Chest tingling sensation    Gastrointestinal: Positive for nausea.   Musculoskeletal: Negative.    Skin: Negative.    Neurological: Negative.    Psychiatric/Behavioral: Negative.        Objective   Physical Exam   Constitutional: He is oriented to person, place, and time. He appears well-developed and well-nourished.   Neck: Neck " supple.   Cardiovascular: Normal rate, regular rhythm and normal heart sounds.   Pulmonary/Chest: Effort normal and breath sounds normal.   Abdominal: Bowel sounds are normal.   Musculoskeletal: He exhibits edema (left ankle mild edema).   Neurological: He is alert and oriented to person, place, and time.   Skin: Skin is warm.   Psychiatric: He has a normal mood and affect.       All tests have been reviewed.    Assessment/Plan   Diagnoses and all orders for this visit:    Essential hypertension  -     Lipid Panel    Left ankle swelling  -     CBC & Differential  -     Comprehensive Metabolic Panel  -     TSH    Class 1 obesity without serious comorbidity with body mass index (BMI) of 31.0 to 31.9 in adult, unspecified obesity type    Chest discomfort EKG normal, ?anxiety related    Nausea watch    Chronic left shoulder pain  -     Ambulatory Referral to Physical Therapy Evaluate and treat    History of hypogonadism  -     Testosterone, Free, Total  -     Prolactin    Anxiety, next    2 weeks after lab          Sleep apnea,  cont cpap     Essential hypertension self d/c'd HCTZ--     Encounter for monitoring Suboxone maintenance therapy tapering follow up with suboxone clinic--     Class 1 obesity weight loss 4 Ib on purpose     Anxiety, prozac self d/c'd     Hypogonadism in male, follow endo, patient declines testo due to panic attack      Liver enzyme elevation resolved after weight loss     Hyperglycemia continue diet     2 week after labs      ECG 12 Lead  Date/Time: 6/22/2020 1:47 PM  Performed by: Maurisio Katz MD  Authorized by: Maurisio Katz MD   Comparison: not compared with previous ECG   Rhythm: sinus rhythm  Rate: normal  Conduction: conduction normal  ST Segments: ST segments normal  T Waves: T waves normal  QRS axis: normal  Other: no other findings    Clinical impression: normal ECG

## 2020-07-09 ENCOUNTER — OFFICE VISIT (OUTPATIENT)
Dept: INTERNAL MEDICINE | Facility: CLINIC | Age: 40
End: 2020-07-09

## 2020-07-09 VITALS
OXYGEN SATURATION: 98 % | WEIGHT: 196 LBS | BODY MASS INDEX: 30.76 KG/M2 | HEART RATE: 91 BPM | TEMPERATURE: 98.7 F | RESPIRATION RATE: 16 BRPM | HEIGHT: 67 IN | SYSTOLIC BLOOD PRESSURE: 134 MMHG | DIASTOLIC BLOOD PRESSURE: 82 MMHG

## 2020-07-09 DIAGNOSIS — E78.5 HYPERLIPIDEMIA, UNSPECIFIED HYPERLIPIDEMIA TYPE: ICD-10-CM

## 2020-07-09 DIAGNOSIS — E29.1 HYPOGONADISM IN MALE: ICD-10-CM

## 2020-07-09 DIAGNOSIS — M25.472 LEFT ANKLE SWELLING: ICD-10-CM

## 2020-07-09 DIAGNOSIS — E66.9 CLASS 1 OBESITY WITHOUT SERIOUS COMORBIDITY WITH BODY MASS INDEX (BMI) OF 31.0 TO 31.9 IN ADULT, UNSPECIFIED OBESITY TYPE: ICD-10-CM

## 2020-07-09 DIAGNOSIS — I10 ESSENTIAL HYPERTENSION: Primary | ICD-10-CM

## 2020-07-09 DIAGNOSIS — Z51.81 ENCOUNTER FOR MONITORING SUBOXONE MAINTENANCE THERAPY: ICD-10-CM

## 2020-07-09 DIAGNOSIS — F41.9 ANXIETY: ICD-10-CM

## 2020-07-09 DIAGNOSIS — G89.29 CHRONIC LEFT SHOULDER PAIN: ICD-10-CM

## 2020-07-09 DIAGNOSIS — M25.512 CHRONIC LEFT SHOULDER PAIN: ICD-10-CM

## 2020-07-09 DIAGNOSIS — Z79.899 ENCOUNTER FOR MONITORING SUBOXONE MAINTENANCE THERAPY: ICD-10-CM

## 2020-07-09 DIAGNOSIS — G47.30 SLEEP APNEA, UNSPECIFIED TYPE: ICD-10-CM

## 2020-07-09 PROBLEM — R11.0 NAUSEA: Status: RESOLVED | Noted: 2020-06-22 | Resolved: 2020-07-09

## 2020-07-09 PROBLEM — R39.198 DIFFICULTY IN URINATION: Status: RESOLVED | Noted: 2018-04-05 | Resolved: 2020-07-09

## 2020-07-09 PROBLEM — Z86.39 HISTORY OF HYPOGONADISM: Status: RESOLVED | Noted: 2020-06-22 | Resolved: 2020-07-09

## 2020-07-09 PROBLEM — R07.89 CHEST DISCOMFORT: Status: RESOLVED | Noted: 2020-06-22 | Resolved: 2020-07-09

## 2020-07-09 PROCEDURE — 99214 OFFICE O/P EST MOD 30 MIN: CPT | Performed by: INTERNAL MEDICINE

## 2020-07-09 NOTE — PROGRESS NOTES
Subjective   Robbin Arteaga is a 39 y.o. male.     Chief Complaint   Patient presents with   • Labs Only       History of Present Illness   Patient here for follow-up.  Blood pressure stable without medication.  Ankle still has some swelling comes and goes.  Weight is still high.  Chest discomfort resolved nausea diarrhea resolved denies any significant anxiety now.  Still has some shoulder pain yet to do physical therapy.  Testosterone still low history of testosterone supplement causing nervousness.  Hyperlipidemia cholesterol elevated and liver enzyme elevated patient is not a alcohol user.    Current Outpatient Medications:   •  SUBOXONE 8-2 MG film film, take 2 FILMs under the tongue once daily, Disp: , Rfl: 0    The following portions of the patient's history were reviewed and updated as appropriate: allergies, current medications, past family history, past medical history, past social history, past surgical history and problem list.    Review of Systems   Constitutional: Negative.    Respiratory: Negative.    Cardiovascular: Negative.    Gastrointestinal: Negative.    Musculoskeletal: Negative.    Skin: Negative.    Neurological: Negative.    Psychiatric/Behavioral: Negative.        Objective   Physical Exam   Constitutional: He is oriented to person, place, and time. He appears well-nourished.   Neck: Neck supple.   Cardiovascular: Normal rate, regular rhythm and normal heart sounds.   Pulmonary/Chest: Effort normal and breath sounds normal.   Abdominal: Bowel sounds are normal.   Neurological: He is alert and oriented to person, place, and time.   Skin: Skin is warm.   Psychiatric: He has a normal mood and affect.       All tests have been reviewed.    Assessment/Plan   Diagnoses and all orders for this visit:    Essential hypertension  -     Comprehensive Metabolic Panel    Class 1 obesity without serious comorbidity with body mass index (BMI) of 31.0 to 31.9 in adult, unspecified obesity  type    Hypogonadism in male    Chronic left shoulder pain    Anxiety    Encounter for monitoring Suboxone maintenance therapy    Left ankle swelling    Sleep apnea, unspecified type    Hyperlipidemia, unspecified hyperlipidemia type  -     Lipid Panel              Essential hypertension normal on diet       Left ankle swelling low salt diet     Class 1 obesity without serious comorbidity with body mass index (BMI) of 31.0 to 31.9 in adult, unspecified obesity type good diet     Chest discomfort resolved.  EKG normal, ?anxiety related     Nausea resolved     Chronic left shoulder pain pending Physical Therapy Evaluate and treat     History of hypogonadism patient declines supplement due to SE of nerve problem  -     Testosterone, Free, Total low  -     Prolactin normal     Anxiety,  Stable without medicine    Hyperlipidemia diet    ALT high diet      4 mo after lab        Historical data below       Sleep apnea,  cont cpap     Essential hypertension self d/c'd HCTZ--     Encounter for monitoring Suboxone maintenance therapy tapering follow up with suboxone clinic--     Class 1 obesity weight loss 4 Ib on purpose     Anxiety, prozac self d/c'd     Hypogonadism in male, follow endo, patient declines testo due to panic attack      Liver enzyme elevation resolved after weight loss     Hyperglycemia continue diet

## 2020-07-13 LAB
ALBUMIN SERPL-MCNC: 4.7 G/DL (ref 4–5)
ALBUMIN/GLOB SERPL: 1.9 {RATIO} (ref 1.2–2.2)
ALP SERPL-CCNC: 71 IU/L (ref 39–117)
ALT SERPL-CCNC: 54 IU/L (ref 0–44)
AST SERPL-CCNC: 30 IU/L (ref 0–40)
BASOPHILS # BLD AUTO: 0 X10E3/UL (ref 0–0.2)
BASOPHILS NFR BLD AUTO: 1 %
BILIRUB SERPL-MCNC: 0.3 MG/DL (ref 0–1.2)
BUN SERPL-MCNC: 16 MG/DL (ref 6–20)
BUN/CREAT SERPL: 22 (ref 9–20)
CALCIUM SERPL-MCNC: 9.7 MG/DL (ref 8.7–10.2)
CHLORIDE SERPL-SCNC: 100 MMOL/L (ref 96–106)
CHOLEST SERPL-MCNC: 199 MG/DL (ref 100–199)
CO2 SERPL-SCNC: 30 MMOL/L (ref 20–29)
CREAT SERPL-MCNC: 0.73 MG/DL (ref 0.76–1.27)
EOSINOPHIL # BLD AUTO: 0.1 X10E3/UL (ref 0–0.4)
EOSINOPHIL NFR BLD AUTO: 2 %
ERYTHROCYTE [DISTWIDTH] IN BLOOD BY AUTOMATED COUNT: 11.9 % (ref 11.6–15.4)
GLOBULIN SER CALC-MCNC: 2.5 G/DL (ref 1.5–4.5)
GLUCOSE SERPL-MCNC: 98 MG/DL (ref 65–99)
HCT VFR BLD AUTO: 43.5 % (ref 37.5–51)
HDLC SERPL-MCNC: 49 MG/DL
HGB BLD-MCNC: 14.4 G/DL (ref 13–17.7)
IMM GRANULOCYTES # BLD AUTO: 0 X10E3/UL (ref 0–0.1)
IMM GRANULOCYTES NFR BLD AUTO: 0 %
LDLC SERPL CALC-MCNC: 132 MG/DL (ref 0–99)
LYMPHOCYTES # BLD AUTO: 1.3 X10E3/UL (ref 0.7–3.1)
LYMPHOCYTES NFR BLD AUTO: 33 %
MCH RBC QN AUTO: 31 PG (ref 26.6–33)
MCHC RBC AUTO-ENTMCNC: 33.1 G/DL (ref 31.5–35.7)
MCV RBC AUTO: 94 FL (ref 79–97)
MONOCYTES # BLD AUTO: 0.3 X10E3/UL (ref 0.1–0.9)
MONOCYTES NFR BLD AUTO: 8 %
NEUTROPHILS # BLD AUTO: 2.3 X10E3/UL (ref 1.4–7)
NEUTROPHILS NFR BLD AUTO: 56 %
PLATELET # BLD AUTO: 221 X10E3/UL (ref 150–450)
POTASSIUM SERPL-SCNC: 4.7 MMOL/L (ref 3.5–5.2)
PROLACTIN SERPL-MCNC: 11.9 NG/ML (ref 4–15.2)
PROT SERPL-MCNC: 7.2 G/DL (ref 6–8.5)
RBC # BLD AUTO: 4.65 X10E6/UL (ref 4.14–5.8)
SODIUM SERPL-SCNC: 141 MMOL/L (ref 134–144)
TESTOST FREE SERPL-MCNC: 4.2 PG/ML (ref 8.7–25.1)
TESTOST SERPL-MCNC: 156 NG/DL (ref 264–916)
TRIGL SERPL-MCNC: 92 MG/DL (ref 0–149)
TSH SERPL DL<=0.005 MIU/L-ACNC: 2.49 UIU/ML (ref 0.45–4.5)
VLDLC SERPL CALC-MCNC: 18 MG/DL (ref 5–40)
WBC # BLD AUTO: 4.1 X10E3/UL (ref 3.4–10.8)

## 2020-08-11 ENCOUNTER — TREATMENT (OUTPATIENT)
Dept: PHYSICAL THERAPY | Facility: CLINIC | Age: 40
End: 2020-08-11

## 2020-08-11 DIAGNOSIS — M25.512 PAIN IN JOINT OF LEFT SHOULDER: Primary | ICD-10-CM

## 2020-08-11 PROCEDURE — 97161 PT EVAL LOW COMPLEX 20 MIN: CPT | Performed by: PHYSICAL THERAPIST

## 2020-08-11 PROCEDURE — 97140 MANUAL THERAPY 1/> REGIONS: CPT | Performed by: PHYSICAL THERAPIST

## 2020-08-11 PROCEDURE — 97110 THERAPEUTIC EXERCISES: CPT | Performed by: PHYSICAL THERAPIST

## 2020-08-11 NOTE — PROGRESS NOTES
Physical Therapy Initial Evaluation and Plan of Care      Patient: Robbin Arteaga   : 1980  Diagnosis/ICD-10 Code:  Pain in joint of left shoulder [M25.512]  Referring practitioner: Maurisio Katz MD    Subjective Evaluation    History of Present Illness  Date of onset: 2019  Mechanism of injury: Insidious onset    Subjective comment: Has had left shoulder pain for the past 18 months without any known cause.  Reports that he has difficulty lifting heavy loads above shoudler height and lying on his left side.  Denies neck pain, numbness and tingling in the left arm and hand.  Patient Occupation:    Precautions and Work Restrictions: NoneQuality of life: excellent    Pain  Quality: pressure and sharp  Relieving factors: rest  Aggravating factors: overhead activity    Hand dominance: right    Patient Goals  Patient goals for therapy: increased strength, return to sport/leisure activities, decreased pain and increased motion             Objective          Active Range of Motion   Left Shoulder   Flexion: 155 degrees   External rotation 0°: 75 degrees   Internal rotation BTB: T7     Right Shoulder   Flexion: 170 degrees   External rotation 0°: 75 degrees   Internal rotation BTB: L1     Strength/Myotome Testing     Left Shoulder     Planes of Motion   Flexion: 5   External rotation at 0°: 5   Internal rotation at 0°: 5     Right Shoulder     Planes of Motion   Flexion: 5   External rotation at 0°: 5   Internal rotation at 0°: 5           Assessment & Plan     Assessment  Impairments: abnormal or restricted ROM, activity intolerance and pain with function  Assessment details: Mr. Arteaga is a 39 year old RHD male that presents to physical therapy with c/o L GHJ pain that started in the spring of 2019.  PMH was covered during the interview.  C/S cleared in all planes.  L GHJ AROM is limited in flx due to pain.  Strength is full in all planes, but reproduction of pain with testing.  Has signs and  symptoms consistent with rotator cuff related pain.  Prognosis: good  Functional Limitations: carrying objects, lifting, sleeping, uncomfortable because of pain and reaching overhead  Goals  Plan Goals: STGs:  1.  QuickDASH improved x 1 MCID in 4 weeks.  2.  Self report at least 50% improvement in symptoms in 4 weeks.    LTGs:  1.  Return to all desired activities and tasks without pain, restriction or functional limitation in 6 weeks.  2.  Return to full weight lifting program without pain, restriction or functional limitations in 8 weeks.    Plan  Therapy options: will be seen for skilled physical therapy services  Planned therapy interventions: manual therapy, strengthening, therapeutic activities and neuromuscular re-education  Frequency: 2x week  Duration in visits: 16  Duration in weeks: 8        Manual Therapy:    15     mins  39051;  Therapeutic Exercise:    10     mins  10435;     Neuromuscular Dionne:        mins  89183;    Therapeutic Activity:          mins  48869;     Gait Training:           mins  33505;     Ultrasound:          mins  25916;    Electrical Stimulation:         mins  23787 (MC );  Dry Needling          mins self-pay    Timed Treatment:   25   mins   Total Treatment:     60   mins    PT SIGNATURE: Jose Dan, PT   DATE TREATMENT INITIATED: 8/11/2020    I certify that the therapy services are furnished while this patient is under my care.  The services outlined above are required by this patient, and will be reviewed every 90 days.     PHYSICIAN: Maurisio Katz MD      DATE:     Please sign and return via fax to [phone].. Thank you, [facility].

## 2020-08-14 ENCOUNTER — TREATMENT (OUTPATIENT)
Dept: PHYSICAL THERAPY | Facility: CLINIC | Age: 40
End: 2020-08-14

## 2020-08-14 DIAGNOSIS — M25.512 CHRONIC PAIN IN LEFT SHOULDER: Primary | ICD-10-CM

## 2020-08-14 DIAGNOSIS — G89.29 CHRONIC PAIN IN LEFT SHOULDER: Primary | ICD-10-CM

## 2020-08-14 PROCEDURE — 97112 NEUROMUSCULAR REEDUCATION: CPT | Performed by: PHYSICAL THERAPIST

## 2020-08-14 PROCEDURE — 97140 MANUAL THERAPY 1/> REGIONS: CPT | Performed by: PHYSICAL THERAPIST

## 2020-08-14 PROCEDURE — 97110 THERAPEUTIC EXERCISES: CPT | Performed by: PHYSICAL THERAPIST

## 2020-08-14 NOTE — PROGRESS NOTES
Physical Therapy Daily Progress Note    Patient: Robbin Arteaga   : 1980  Diagnosis/ICD-10 Code:  Chronic pain in left shoulder [M25.512, G89.29]  Referring practitioner: Maurisio Katz MD  Date of Initial Visit: No linked episodes  Today's Date: 2020  Patient seen for Visit count could not be calculated. Make sure you are using a visit which is associated with an episode. sessions         Robbin Arteaga reports considerable soreness after his last visit that feels like soreness after a workout.    Subjective     Objective   See Exercise, Manual, and Modality Logs for complete treatment.       Assessment/Plan  Focus of treatment today was to decrease mm sensitivity along the anterior GHJ and chest wall.  Combined with focus on improving mm activation and force output of the posterior GHJ mm.         Manual Therapy:    15     mins  55363;  Therapeutic Exercise:    15     mins  56152;     Neuromuscular Dionne:    10    mins  34595;    Therapeutic Activity:          mins  57519;     Gait Training:           mins  66177;     Ultrasound:          mins  66636;    Electrical Stimulation:         mins  36703 ( );  Dry Needling          mins self-pay    Timed Treatment:   40   mins   Total Treatment:     45   mins    Jose Dan PT  Physical Therapist

## 2020-08-21 ENCOUNTER — TREATMENT (OUTPATIENT)
Dept: PHYSICAL THERAPY | Facility: CLINIC | Age: 40
End: 2020-08-21

## 2020-08-21 DIAGNOSIS — G89.29 CHRONIC LEFT SHOULDER PAIN: Primary | ICD-10-CM

## 2020-08-21 DIAGNOSIS — M25.512 CHRONIC LEFT SHOULDER PAIN: Primary | ICD-10-CM

## 2020-08-21 PROCEDURE — 97530 THERAPEUTIC ACTIVITIES: CPT | Performed by: PHYSICAL THERAPIST

## 2020-08-21 PROCEDURE — 97140 MANUAL THERAPY 1/> REGIONS: CPT | Performed by: PHYSICAL THERAPIST

## 2020-08-21 PROCEDURE — 97110 THERAPEUTIC EXERCISES: CPT | Performed by: PHYSICAL THERAPIST

## 2020-08-21 NOTE — PROGRESS NOTES
Physical Therapy Daily Progress Note    Patient: Robbin Arteaga   : 1980  Diagnosis/ICD-10 Code:  Chronic left shoulder pain [M25.512, G89.29]  Referring practitioner: Maurisio Katz MD  Date of Initial Visit: No linked episodes  Today's Date: 2020  Patient seen for Visit count could not be calculated. Make sure you are using a visit which is associated with an episode. sessions         Robbin Arteaga reports that he has more range of motion, and noticeable less pain when lifting arm overhead.    Subjective     Objective   See Exercise, Manual, and Modality Logs for complete treatment.       Assessment/Plan  Focus of care was to improve mobility of the ACJ and GHJ, and to improve posterior cuff mm endurance.       Manual Therapy:    10     mins  74436;  Therapeutic Exercise:    25     mins  19516;     Neuromuscular Dionne:        mins  17545;    Therapeutic Activity:     10     mins  56542;     Gait Training:           mins  51731;     Ultrasound:          mins  59171;    Electrical Stimulation:         mins  07176 ( );  Dry Needling          mins self-pay    Timed Treatment:   45   mins   Total Treatment:     45   mins    Jose Dan PT  Physical Therapist

## 2020-08-27 ENCOUNTER — TREATMENT (OUTPATIENT)
Dept: PHYSICAL THERAPY | Facility: CLINIC | Age: 40
End: 2020-08-27

## 2020-08-27 DIAGNOSIS — M25.512 CHRONIC LEFT SHOULDER PAIN: Primary | ICD-10-CM

## 2020-08-27 DIAGNOSIS — G89.29 CHRONIC LEFT SHOULDER PAIN: Primary | ICD-10-CM

## 2020-08-27 PROCEDURE — 97140 MANUAL THERAPY 1/> REGIONS: CPT | Performed by: PHYSICAL THERAPIST

## 2020-08-27 PROCEDURE — 97530 THERAPEUTIC ACTIVITIES: CPT | Performed by: PHYSICAL THERAPIST

## 2020-08-27 PROCEDURE — 97110 THERAPEUTIC EXERCISES: CPT | Performed by: PHYSICAL THERAPIST

## 2020-08-27 NOTE — PROGRESS NOTES
Physical Therapy Daily Progress Note    Patient: Robbin Arteaga   : 1980  Diagnosis/ICD-10 Code:  Chronic left shoulder pain [M25.512, G89.29]  Referring practitioner: Maurisio Katz MD  Date of Initial Visit: No linked episodes  Today's Date: 2020  Patient seen for Visit count could not be calculated. Make sure you are using a visit which is associated with an episode. sessions         Robbin Arteaga reports that he definitely feels better since starting physical therapy.  Has no pain with moving his arm overhead.  Sleep has not changed in terms of difficulty.  Notes that he was able to do 4 push ups last week for the first time in months, but they were painful.  Reports having pain when pulling his 65 lb bow.      Subjective     Objective   See Exercise, Manual, and Modality Logs for complete treatment.       Assessment/Plan  Focus of care continues to be improving GHJ mobility in all planes.  Combined with improving overhead loading tolerance, and pushing power of the L GHJ.       Manual Therapy:    10     mins  55734;  Therapeutic Exercise:    25     mins  47209;     Neuromuscular Dionne:       mins  94514;    Therapeutic Activity:     15     mins  32655;     Gait Training:           mins  01382;     Ultrasound:          mins  12746;    Electrical Stimulation:         mins  40643 ( );  Dry Needling          mins self-pay    Timed Treatment:  50    mins   Total Treatment:     50   mins    Jose Dan PT  Physical Therapist

## 2020-12-21 ENCOUNTER — OFFICE VISIT (OUTPATIENT)
Dept: PULMONOLOGY | Facility: CLINIC | Age: 40
End: 2020-12-21

## 2020-12-21 VITALS
HEART RATE: 94 BPM | DIASTOLIC BLOOD PRESSURE: 100 MMHG | TEMPERATURE: 97.5 F | BODY MASS INDEX: 31.39 KG/M2 | RESPIRATION RATE: 16 BRPM | SYSTOLIC BLOOD PRESSURE: 150 MMHG | WEIGHT: 200 LBS | HEIGHT: 67 IN | OXYGEN SATURATION: 97 %

## 2020-12-21 DIAGNOSIS — E66.9 OBESITY (BMI 30-39.9): ICD-10-CM

## 2020-12-21 DIAGNOSIS — G47.33 OSA (OBSTRUCTIVE SLEEP APNEA): Primary | ICD-10-CM

## 2020-12-21 PROCEDURE — 99214 OFFICE O/P EST MOD 30 MIN: CPT | Performed by: INTERNAL MEDICINE

## 2021-02-15 ENCOUNTER — OFFICE VISIT (OUTPATIENT)
Dept: INTERNAL MEDICINE | Facility: CLINIC | Age: 41
End: 2021-02-15

## 2021-02-15 DIAGNOSIS — F41.9 ANXIETY: ICD-10-CM

## 2021-02-15 DIAGNOSIS — Z51.81 ENCOUNTER FOR MONITORING SUBOXONE MAINTENANCE THERAPY: ICD-10-CM

## 2021-02-15 DIAGNOSIS — Z79.899 ENCOUNTER FOR MONITORING SUBOXONE MAINTENANCE THERAPY: ICD-10-CM

## 2021-02-15 DIAGNOSIS — E66.9 CLASS 1 OBESITY WITHOUT SERIOUS COMORBIDITY WITH BODY MASS INDEX (BMI) OF 31.0 TO 31.9 IN ADULT, UNSPECIFIED OBESITY TYPE: ICD-10-CM

## 2021-02-15 DIAGNOSIS — I10 ESSENTIAL HYPERTENSION: Primary | ICD-10-CM

## 2021-02-15 DIAGNOSIS — G47.30 SLEEP APNEA, UNSPECIFIED TYPE: ICD-10-CM

## 2021-02-15 PROCEDURE — 99214 OFFICE O/P EST MOD 30 MIN: CPT | Performed by: INTERNAL MEDICINE

## 2021-02-15 RX ORDER — LOSARTAN POTASSIUM 50 MG/1
50 TABLET ORAL DAILY
Qty: 30 TABLET | Refills: 1 | Status: SHIPPED | OUTPATIENT
Start: 2021-02-15 | End: 2021-05-12 | Stop reason: SDUPTHER

## 2021-02-15 NOTE — PROGRESS NOTES
Subjective   Robbin Arteaga is a 40 y.o. male.     Chief Complaint   Patient presents with   • Hypertension     Patient states that he has sleep apnea and was told that his BP was high. He has started to monitor and it has been as high at 140/90 which he says is high for him. He states he can tell that his BP is high becuase his face will feel flushed and he will start to get anxious.       History of Present Illness video visit today  Patient complaints of blood pressure elevated on and off.  When blood pressure is high patient fell anxious nervous denies any headache or blurry vision.  No chest pain no short of breath.  Patient does have anxiety stating mild no depression.  Patient is on Suboxone.  Weight is elevated.  Sleep apnea on CPAP is stable.    Current Outpatient Medications:   •  SUBOXONE 8-2 MG film film, take 2 FILMs under the tongue once daily, Disp: , Rfl: 0  •  losartan (Cozaar) 50 MG tablet, Take 1 tablet by mouth Daily., Disp: 30 tablet, Rfl: 1    The following portions of the patient's history were reviewed and updated as appropriate: allergies, current medications, past family history, past medical history, past social history, past surgical history and problem list.    Review of Systems   Constitutional: Negative.    Respiratory: Negative.    Cardiovascular: Negative.         Hypertension   Gastrointestinal: Negative.    Musculoskeletal: Negative.    Skin: Negative.    Neurological: Negative.    Psychiatric/Behavioral: The patient is nervous/anxious.        Objective   Physical Exam  Constitutional:       Appearance: He is obese.   Pulmonary:      Effort: Pulmonary effort is normal.   Neurological:      Mental Status: He is alert.   Psychiatric:         Mood and Affect: Mood normal.         All tests have been reviewed.    Assessment/Plan   Diagnoses and all orders for this visit:    Essential hypertension initiate medication  -     losartan (Cozaar) 50 MG tablet; Take 1 tablet by mouth  Daily.    Class 1 obesity without serious comorbidity with body mass index (BMI) of 31.0 to 31.9 in adult, unspecified obesity type good diet weight loss    Anxiety declined medication now    Encounter for monitoring Suboxone maintenance therapy continue    Sleep apnea, unspecified type continue CPAP    3 weeks with others

## 2021-04-01 ENCOUNTER — IMMUNIZATION (OUTPATIENT)
Dept: VACCINE CLINIC | Facility: HOSPITAL | Age: 41
End: 2021-04-01

## 2021-04-01 PROCEDURE — 0001A: CPT | Performed by: INTERNAL MEDICINE

## 2021-04-01 PROCEDURE — 91300 HC SARSCOV02 VAC 30MCG/0.3ML IM: CPT | Performed by: INTERNAL MEDICINE

## 2021-04-22 ENCOUNTER — IMMUNIZATION (OUTPATIENT)
Dept: VACCINE CLINIC | Facility: HOSPITAL | Age: 41
End: 2021-04-22

## 2021-04-22 PROCEDURE — 91300 HC SARSCOV02 VAC 30MCG/0.3ML IM: CPT | Performed by: INTERNAL MEDICINE

## 2021-04-22 PROCEDURE — 0002A: CPT | Performed by: INTERNAL MEDICINE

## 2021-05-12 DIAGNOSIS — I10 ESSENTIAL HYPERTENSION: ICD-10-CM

## 2021-05-12 NOTE — TELEPHONE ENCOUNTER
Caller: Robbin Arteaga    Relationship: Self    Best call back number: [phone]    Medication needed:   Requested Prescriptions     Pending Prescriptions Disp Refills   • losartan (Cozaar) 50 MG tablet 30 tablet 1     Sig: Take 1 tablet by mouth Daily.       When do you need the refill by: AS SOON AS POSSIBLE    What additional details did the patient provide when requesting the medication: PATIENT HAS 1 TABLET LEFT.    Does the patient have less than a 3 day supply:  [x] Yes  [] No    What is the patient's preferred pharmacy: [id number] [place] [address] [address] AT [facility] - [phone] PH - [phone] FX

## 2021-05-12 NOTE — TELEPHONE ENCOUNTER
Last office visit  02/15/2021  Next scheduled appointment 06/17/2021    Please advise since PCP is out of the office

## 2021-05-13 RX ORDER — LOSARTAN POTASSIUM 50 MG/1
50 TABLET ORAL DAILY
Qty: 90 TABLET | Refills: 0 | Status: SHIPPED | OUTPATIENT
Start: 2021-05-13 | End: 2021-09-14 | Stop reason: SDUPTHER

## 2021-06-10 ENCOUNTER — OFFICE VISIT (OUTPATIENT)
Dept: PULMONOLOGY | Facility: CLINIC | Age: 41
End: 2021-06-10

## 2021-06-10 VITALS
DIASTOLIC BLOOD PRESSURE: 84 MMHG | OXYGEN SATURATION: 99 % | HEART RATE: 117 BPM | RESPIRATION RATE: 16 BRPM | HEIGHT: 67 IN | SYSTOLIC BLOOD PRESSURE: 140 MMHG | BODY MASS INDEX: 31.23 KG/M2 | WEIGHT: 199 LBS

## 2021-06-10 DIAGNOSIS — G47.19 EXCESSIVE DAYTIME SLEEPINESS: ICD-10-CM

## 2021-06-10 DIAGNOSIS — E66.9 OBESITY (BMI 30-39.9): ICD-10-CM

## 2021-06-10 DIAGNOSIS — G47.33 OSA (OBSTRUCTIVE SLEEP APNEA): Primary | ICD-10-CM

## 2021-06-10 PROCEDURE — 99213 OFFICE O/P EST LOW 20 MIN: CPT | Performed by: NURSE PRACTITIONER

## 2021-06-10 NOTE — PROGRESS NOTES
"Chief Complaint   Patient presents with   • Follow-up   • Sleeping Problem         Subjective   Robbin Arteaga is a 40 y.o. male.     History of Present Illness   Patient comes back today for follow up of Obstructive Sleep apnea. he doesn't report any issues with the device or mask.     Patient says that he is compliant with his device and using it regularly.    Patient's symptoms of sleep disturbance and daytime sleepiness have been helped greatly with the use of PAP device, as prescribed.  He has not had any issue using the machine.    He does feel much more rested and states he is not falling asleep while sitting at red lights.  He also feels that he sleeps much better.    His only complaint is that the hour reading when he looks at it in the morning never seems to show the correct number of hours.  It has shown as little as 1-1/2-hour use for the night and as high as 33 hours for the use.    He states there are some nights he does not use the machine such as if he is out of town with [employer] and does not have electricity available however most nights he does use the machine and he feels like he uses it 4 to 5 hours on those nights.    He has changed back to a nasal mask because it felt more comfortable and he denies having any issues with the mask.      The following portions of the patient's history were reviewed and updated as appropriate: allergies, current medications, past family history, past medical history, past social history and past surgical history.    Review of Systems   Constitutional: Negative for chills and fever.   HENT: Negative for rhinorrhea, sinus pressure, sneezing and sore throat.    Respiratory: Negative for cough, shortness of breath and wheezing.    Psychiatric/Behavioral: Negative for sleep disturbance.       Objective   Visit Vitals  /84   Pulse 117   Resp 16   Ht 170.2 cm (67.01\")   Wt 90.3 kg (199 lb)   SpO2 99%   BMI 31.16 kg/m²       Physical Exam  Vitals reviewed. "   Constitutional:       Appearance: He is well-developed.   HENT:      Head: Atraumatic.      Mouth/Throat:      Comments: Crowded oropharynx.  Eyes:      Extraocular Movements: Extraocular movements intact.   Musculoskeletal:      Comments: Gait normal.   Neurological:      Mental Status: He is alert and oriented to person, place, and time.           Assessment/Plan   Diagnoses and all orders for this visit:    1. NICOLASA (obstructive sleep apnea) (Primary)  -     BIPAP / CPAP Adjustment    2. Obesity (BMI 30-39.9)    3. Excessive daytime sleepiness           Return in about 5 months (around 11/10/2021) for Recheck, For Dr. Singleton, Sleep ONLY.    DISCUSSION (if any):  Continue treatment with AutoPAP at a pressure of 5/15, with a nasal mask.    Patient's compliance data was reviewed and he is not compliant according to the report.  The patient does not feel these numbers are accurate.    I will send an order to have the Visual Pro 360 check his machine for logging accuracy of the number of hours used.    He states he seems to be keeping the mask on during sleep as his wife is a respiratory therapist who works night shift and the first thing she does when she comes home is make sure he has the mask on.    Humidification setup, hose and mask care discussed.    Weight loss advised.    Use every night for at least 4 hours stressed.    I have discussed with the patient that we may consider doing an in lab sleep study if it appears he is continuing to take the mask off during the night.    His previous sleep study was a home sleep study from February 2020 which revealed moderate sleep apnea with an apnea-hypopnea index of 19/h.    I have spent 20 minutes face-to-face with the patient discussing his sleep issues, issues with the machine/mask, and compliance report.    Dictated utilizing Dragon dictation.    This document was electronically signed by DEBBI Ellison Mireya 10, 2021  15:35 EDT

## 2021-06-17 ENCOUNTER — OFFICE VISIT (OUTPATIENT)
Dept: INTERNAL MEDICINE | Facility: CLINIC | Age: 41
End: 2021-06-17

## 2021-06-17 VITALS
SYSTOLIC BLOOD PRESSURE: 136 MMHG | HEART RATE: 97 BPM | DIASTOLIC BLOOD PRESSURE: 84 MMHG | HEIGHT: 67 IN | BODY MASS INDEX: 31.39 KG/M2 | TEMPERATURE: 97.1 F | OXYGEN SATURATION: 99 % | WEIGHT: 200 LBS

## 2021-06-17 DIAGNOSIS — Z51.81 ENCOUNTER FOR MONITORING SUBOXONE MAINTENANCE THERAPY: ICD-10-CM

## 2021-06-17 DIAGNOSIS — G89.29 CHRONIC LEFT SHOULDER PAIN: ICD-10-CM

## 2021-06-17 DIAGNOSIS — M25.472 LEFT ANKLE SWELLING: ICD-10-CM

## 2021-06-17 DIAGNOSIS — F41.9 ANXIETY: ICD-10-CM

## 2021-06-17 DIAGNOSIS — I10 ESSENTIAL HYPERTENSION: Primary | ICD-10-CM

## 2021-06-17 DIAGNOSIS — G47.30 SLEEP APNEA, UNSPECIFIED TYPE: ICD-10-CM

## 2021-06-17 DIAGNOSIS — E29.1 HYPOGONADISM IN MALE: ICD-10-CM

## 2021-06-17 DIAGNOSIS — M25.512 CHRONIC LEFT SHOULDER PAIN: ICD-10-CM

## 2021-06-17 DIAGNOSIS — Z79.899 ENCOUNTER FOR MONITORING SUBOXONE MAINTENANCE THERAPY: ICD-10-CM

## 2021-06-17 DIAGNOSIS — E66.9 CLASS 1 OBESITY WITHOUT SERIOUS COMORBIDITY WITH BODY MASS INDEX (BMI) OF 31.0 TO 31.9 IN ADULT, UNSPECIFIED OBESITY TYPE: ICD-10-CM

## 2021-06-17 DIAGNOSIS — Z00.00 ANNUAL PHYSICAL EXAM: ICD-10-CM

## 2021-06-17 PROCEDURE — 99214 OFFICE O/P EST MOD 30 MIN: CPT | Performed by: INTERNAL MEDICINE

## 2021-06-17 NOTE — PROGRESS NOTES
Subjective   Robbin Arteaga is a 40 y.o. male.     Chief Complaint   Patient presents with   • Follow-up   • Hypertension       History of Present Illness   Patient here for follow-up.  Blood pressure normalized after medication.  Weight is still high.  Anxiety stable now.  Leg swelling stable.  Sleep apnea on CPAP machine.  Shoulder pain improved.    Current Outpatient Medications:   •  losartan (Cozaar) 50 MG tablet, Take 1 tablet by mouth Daily., Disp: 90 tablet, Rfl: 0  •  SUBOXONE 8-2 MG film film, take 2 FILMs under the tongue once daily, Disp: , Rfl: 0    The following portions of the patient's history were reviewed and updated as appropriate: allergies, current medications, past family history, past medical history, past social history, past surgical history and problem list.    Review of Systems   Constitutional: Negative.    Respiratory: Negative.    Cardiovascular: Negative.    Gastrointestinal: Negative.    Musculoskeletal: Negative.    Skin: Negative.    Neurological: Negative.    Psychiatric/Behavioral: Negative.        Objective   Physical Exam  Cardiovascular:      Rate and Rhythm: Normal rate and regular rhythm.      Heart sounds: Normal heart sounds.   Pulmonary:      Effort: Pulmonary effort is normal.      Breath sounds: Normal breath sounds.   Abdominal:      General: Bowel sounds are normal.   Musculoskeletal:      Cervical back: Neck supple.   Skin:     General: Skin is warm.   Neurological:      Mental Status: He is alert and oriented to person, place, and time.         All tests have been reviewed.    Assessment/Plan   Diagnoses and all orders for this visit:    Essential hypertensionm cont med    Hypogonadism in male self d/c due to panic and sleep prob    Class 1 obesity without serious comorbidity with body mass index (BMI) of 31.0 to 31.9 in adult, unspecified obesity type to loose weight    Encounter for monitoring Suboxone maintenance therapy    Anxiety stable now    Left ankle  swelling, mild and watch    Sleep apnea, unspecified type cont cpap    Chronic left shoulder pain improved    1 months annual physical after blood tests              Historical data below       Encounter for monitoring Suboxone maintenance therapy tapering follow up with suboxone clinic--    Anxiety, prozac self d/c'd     Hypogonadism in male, follow endo, patient declines testo due to panic attack      Liver enzyme elevation resolved after weight loss     Hyperglycemia continue diet

## 2021-07-01 ENCOUNTER — TELEPHONE (OUTPATIENT)
Dept: INTERNAL MEDICINE | Facility: CLINIC | Age: 41
End: 2021-07-01

## 2021-07-01 DIAGNOSIS — R13.10 DYSPHAGIA, UNSPECIFIED TYPE: Primary | ICD-10-CM

## 2021-07-01 NOTE — TELEPHONE ENCOUNTER
Caller: Robbin Arteaga    Relationship: Self    Best call back number: [phone]    What is the medical concern/diagnosis: DIFFICULTY SWALLOWING     What specialty or service is being requested: GI REFERRAL FOR ENDOSCOPY     What is the provider, practice or medical service name: GASTROENTEROLOGY     What is the office location:     What is the office phone number:     Any additional details: PATIENT WENT TO [facility]DAY AND WAS TOLD TO GET A GI REFERRAL

## 2021-07-01 NOTE — TELEPHONE ENCOUNTER
Called and spoke to patient he is aware that Dr. Katz has placed a referral for GI and that they will contact him to schedule an appointment.

## 2021-09-14 DIAGNOSIS — I10 ESSENTIAL HYPERTENSION: ICD-10-CM

## 2021-09-14 RX ORDER — LOSARTAN POTASSIUM 50 MG/1
50 TABLET ORAL DAILY
Qty: 30 TABLET | Refills: 0 | Status: SHIPPED | OUTPATIENT
Start: 2021-09-14 | End: 2021-10-28

## 2021-10-28 DIAGNOSIS — I10 ESSENTIAL HYPERTENSION: ICD-10-CM

## 2021-10-28 RX ORDER — LOSARTAN POTASSIUM 50 MG/1
50 TABLET ORAL DAILY
Qty: 30 TABLET | Refills: 0 | Status: CANCELLED | OUTPATIENT
Start: 2021-10-28

## 2021-10-28 RX ORDER — LOSARTAN POTASSIUM 50 MG/1
50 TABLET ORAL DAILY
Qty: 30 TABLET | Refills: 0 | Status: SHIPPED | OUTPATIENT
Start: 2021-10-28 | End: 2021-12-13 | Stop reason: SDUPTHER

## 2021-10-28 NOTE — TELEPHONE ENCOUNTER
Rx Refill Note  Requested Prescriptions     Pending Prescriptions Disp Refills   • losartan (COZAAR) 50 MG tablet [Pharmacy Med Name: Losartan Potassium 50 MG Oral Tablet (Cozaar)] 30 tablet 0     Sig: Take 1 tablet by mouth Daily. MUST HAVE APPOINTMENT FOR FURTHER REFILLS.      Last office visit with prescribing clinician: 6/17/2021   Next office visit with prescribing clinician: Visit date not found            CARMEN LOZANO MA  10/28/21, 13:34 EDT

## 2021-11-10 ENCOUNTER — OFFICE VISIT (OUTPATIENT)
Dept: PULMONOLOGY | Facility: CLINIC | Age: 41
End: 2021-11-10

## 2021-11-10 VITALS
HEIGHT: 67 IN | HEART RATE: 91 BPM | OXYGEN SATURATION: 100 % | SYSTOLIC BLOOD PRESSURE: 134 MMHG | WEIGHT: 200 LBS | DIASTOLIC BLOOD PRESSURE: 84 MMHG | RESPIRATION RATE: 18 BRPM | BODY MASS INDEX: 31.39 KG/M2

## 2021-11-10 DIAGNOSIS — G47.33 OSA (OBSTRUCTIVE SLEEP APNEA): Primary | ICD-10-CM

## 2021-11-10 PROCEDURE — 99214 OFFICE O/P EST MOD 30 MIN: CPT | Performed by: INTERNAL MEDICINE

## 2021-11-10 NOTE — PROGRESS NOTES
"  Chief Complaint   Patient presents with   • Follow-up   • Shortness of Breath       Subjective   Robbin Arteaga is a 41 y.o. male.     History of Present Illness   Patient comes in today to follow-up on obstructive sleep apnea.    The patient says that although he does feel better even when he is able to use the CPAP for 1 or 2 hours at night, he continues to have issues and the fact that he finds the mask off his face.    The patient says that he was given a full facemask but because of his claustrophobia, he could not tolerate it although he did feel that it was maybe slightly better, compared to his current mask.    The following portions of the patient's history were reviewed and updated as appropriate: allergies, current medications, past family history, past medical history, past social history and past surgical history.    Review of Systems   HENT: Negative for sinus pressure, sneezing and sore throat.    Respiratory: Negative for cough, chest tightness, shortness of breath and wheezing.        Objective   Visit Vitals  /84   Pulse 91   Resp 18   Ht 170.2 cm (67.01\")   Wt 90.7 kg (200 lb)   SpO2 100%   BMI 31.32 kg/m²       Physical Exam  Vitals reviewed.   Constitutional:       Appearance: He is well-developed.   HENT:      Head: Atraumatic.      Mouth/Throat:      Comments: Oropharynx was crowded.  Neck:      Vascular: No JVD.   Musculoskeletal:      Cervical back: Neck supple.      Comments: Gait was normal.   Skin:     General: Skin is warm and dry.   Neurological:      Mental Status: He is alert and oriented to person, place, and time.           Assessment/Plan   Diagnoses and all orders for this visit:    1. NICOLASA (obstructive sleep apnea) (Primary)           Return in about 3 months (around 2/10/2022) for Give Recall info, Grant/Laurie.    DISCUSSION (if any):  I reviewed the results of last sleep study in detail. I informed him that the apnea hypopnea index was 19 / hr. This was home  " "study, done in Feb 2020.     I told the patient that his symptoms are consistent with partially controlled sleep apnea.    It appears that some of his issues are secondary to the mask.  The patient was provided with Dream wear full face mask. We will ask the [employer] company to provide him with the same mask to use at home.     The patient appears to have issues with getting used to the mask at night.  I recommended that he uses the mask for 15-30 minutes every day, to get used to it.    Patient was advised to start using PAP for at least 4 hours every night.    Patient was advised to call this office with any issues.    Patient was informed about the voluntary recall issued by [employer] for some of their PAP devices.  Patient was also urged to register the device with [employer], on their website, to obtain up-to-date information on repair or a replacement.    It appears that as per the recent recommendations, the patient's need to avoid ozone based cleaning methods and also avoid exposure of the devices to high humidity or high temperatures.    Patient can look into adding inline filter and to consider changing it on a regular basis.      It is not possible to make any predictions with regards to increase in the possibility of health related complications, in the event obstructive sleep apnea is not treated even for short time.    Patients were urged to contact their DME company, regarding their concern for their current PAP devices. We informed them, that we can only order \"PAP devices\" but have no control over which brand of device they receive/received from their DME company. A change in the device will have to be determined by their DME company and insurance company.     We will keep following American Academy of sleep medicine and American thoracic Society guidelines and update recommendations as they are released.    I spent a total of 32 minutes face to face with this patient. his pertinent current and previous " data, as applicable, were reviewed with the patient. Patient's diagnostic studies were also reviewed, including previous sleep study. We also discussed sleep hygiene measures and measures to increase compliance with the CPAP device.  Time also includes documentation in the chart and reviewing data in the chart.         Dictated utilizing Dragon dictation.    This document was electronically signed by Hemanth Singleton MD on 11/10/21 at 16:09 EST

## 2021-12-13 DIAGNOSIS — I10 ESSENTIAL HYPERTENSION: ICD-10-CM

## 2021-12-13 RX ORDER — LOSARTAN POTASSIUM 50 MG/1
50 TABLET ORAL DAILY
Qty: 30 TABLET | Refills: 0 | Status: SHIPPED | OUTPATIENT
Start: 2021-12-13 | End: 2022-02-04 | Stop reason: ALTCHOICE

## 2021-12-13 NOTE — TELEPHONE ENCOUNTER
Caller: Ava Arteagaalexandr Swanson    Relationship: Self    Best call back number: [phone]    Requested Prescriptions:   Requested Prescriptions     Pending Prescriptions Disp Refills   • losartan (COZAAR) 50 MG tablet 30 tablet 0     Sig: Take 1 tablet by mouth Daily. MUST HAVE APPOINTMENT FOR FURTHER REFILLS.        Pharmacy where request should be sent: [facility]     Additional details provided by patient: PATIENT HAS APPOINTMENT IN January AND WOULD NEED REFILL     Does the patient have less than a 3 day supply:  [x] Yes  [] No    Lyudmila Schwartz Rep   12/13/21 12:25 EST

## 2022-01-11 ENCOUNTER — OFFICE VISIT (OUTPATIENT)
Dept: FAMILY MEDICINE CLINIC | Facility: CLINIC | Age: 42
End: 2022-01-11

## 2022-01-11 VITALS
RESPIRATION RATE: 18 BRPM | SYSTOLIC BLOOD PRESSURE: 130 MMHG | DIASTOLIC BLOOD PRESSURE: 80 MMHG | HEART RATE: 86 BPM | TEMPERATURE: 97.2 F | BODY MASS INDEX: 32.21 KG/M2 | WEIGHT: 205.2 LBS | OXYGEN SATURATION: 98 % | HEIGHT: 67 IN

## 2022-01-11 DIAGNOSIS — K21.9 HIATAL HERNIA WITH GERD WITHOUT ESOPHAGITIS: ICD-10-CM

## 2022-01-11 DIAGNOSIS — Z23 NEED FOR PROPHYLACTIC VACCINATION AND INOCULATION AGAINST INFLUENZA: ICD-10-CM

## 2022-01-11 DIAGNOSIS — E29.1 HYPOGONADISM MALE: ICD-10-CM

## 2022-01-11 DIAGNOSIS — R53.81 MALAISE AND FATIGUE: ICD-10-CM

## 2022-01-11 DIAGNOSIS — G47.33 OBSTRUCTIVE SLEEP APNEA ON CPAP: ICD-10-CM

## 2022-01-11 DIAGNOSIS — Z99.89 OBSTRUCTIVE SLEEP APNEA ON CPAP: ICD-10-CM

## 2022-01-11 DIAGNOSIS — R00.2 PALPITATIONS: ICD-10-CM

## 2022-01-11 DIAGNOSIS — K44.9 HIATAL HERNIA WITH GERD WITHOUT ESOPHAGITIS: ICD-10-CM

## 2022-01-11 DIAGNOSIS — K22.4 ESOPHAGEAL SPASM: ICD-10-CM

## 2022-01-11 DIAGNOSIS — F98.8 ATTENTION DEFICIT DISORDER (ADD) IN ADULT: ICD-10-CM

## 2022-01-11 DIAGNOSIS — R53.83 MALAISE AND FATIGUE: ICD-10-CM

## 2022-01-11 DIAGNOSIS — I10 ESSENTIAL HYPERTENSION: Primary | ICD-10-CM

## 2022-01-11 PROCEDURE — 90471 IMMUNIZATION ADMIN: CPT | Performed by: FAMILY MEDICINE

## 2022-01-11 PROCEDURE — 90686 IIV4 VACC NO PRSV 0.5 ML IM: CPT | Performed by: FAMILY MEDICINE

## 2022-01-11 PROCEDURE — 93000 ELECTROCARDIOGRAM COMPLETE: CPT | Performed by: FAMILY MEDICINE

## 2022-01-11 PROCEDURE — 99215 OFFICE O/P EST HI 40 MIN: CPT | Performed by: FAMILY MEDICINE

## 2022-01-11 RX ORDER — NIFEDIPINE 30 MG/1
30 TABLET, FILM COATED, EXTENDED RELEASE ORAL DAILY
Qty: 90 TABLET | Refills: 0 | Status: SHIPPED | OUTPATIENT
Start: 2022-01-11 | End: 2022-01-28

## 2022-01-11 NOTE — PROGRESS NOTES
Established Patient        Chief Complaint:   Chief Complaint   Patient presents with   • Establish Care        Robbin Arteaga is a 41 y.o. male    History of Present Illness:   Here today to establish with a new primary care provider.  Previously established with another physician in the [facility].  Patient's wife is also seen at this practice, they wish to be seen in the same location.    Patient has a known history of hypogonadism, although intolerant to testosterone supplementation in the past.  He continues to deal with malaise/fatigue, as well as difficulty with concentration/focus.  Also has history of hypertension, hiatal hernia and obstructive sleep apnea with CPAP use.    Patient reports intolerance to initial testosterone supplementation.  He has instead tried to utilize dietary modifications and exercise changes in an effort to improve his testosterone levels as naturally as possible.    Patient reports a known history of hiatal hernia, does report periodic chest discomfort, described as palpitations and epigastric/sternal in nature, occasionally radiating to the left side of his chest.  He denies any syncope or vertigo.  Patient also reports abnormal sensation to hands and feet with cold exposure, patient states symptoms improved with warm garments or hot water utilization.  Denies any other skin changes.  Denies contact irritants.    Patient also has a significant history of opioid dependence, spent a period of time on methadone prior to transitioning to Suboxone.    Subjective     The following portions of the patient's history were reviewed and updated as appropriate: allergies, current medications, past family history, past medical history, past social history, past surgical history and problem list.    Allergies   Allergen Reactions   • Penicillins Other (See Comments)     Pt states it happened when he was a kid and he is unsure of the reaction        Review  "of Systems  1. Constitutional: Negative for fever. Negative for chills, diaphoresis.  Malaise/fatigue as per above, and without unexpected weight change.   2. HENT: No dysphagia; no changes to vision/hearing/smell/taste; no epistaxis  3. Eyes: Negative for redness and visual disturbance.   4. Respiratory: As per above.   5. Cardiovascular: Paroxysmal chest discomfort, occasional palpitations.  6. Gastrointestinal: Negative for abdominal distention, abdominal pain and blood in stool.   7. Endocrine: Negative for cold intolerance and heat intolerance.   8. Genitourinary: Negative for difficulty urinating, dysuria and frequency.   9. Musculoskeletal: Negative for arthralgias, back pain and myalgias.   10. Skin: Negative for color change, rash and wound.   11. Neurological: Negative for syncope, weakness and headaches.  Paresthesias to hands and feet as per above.  12. Hematological: Negative for adenopathy. Does not bruise/bleed easily.   13. Psychiatric/Behavioral: Negative for confusion. The patient is not nervous/anxious.  Attention deficit as per above.    Objective     Physical Exam   Vital Signs: /80   Pulse 86   Temp 97.2 °F (36.2 °C)   Resp 18   Ht 170.2 cm (67.01\")   Wt 93.1 kg (205 lb 3.2 oz)   SpO2 98%   BMI 32.13 kg/m²     General Appearance: alert, oriented x 3, no acute distress.  Skin: warm and dry.   HEENT: Atraumatic.  pupils round and reactive to light and accommodation, oral mucosa pink and moist.  Nares patent without epistaxis.  External auditory canals are patent tympanic membranes intact.  Neck: supple, no JVD, trachea midline.  No thyromegaly  Lungs: CTA, unlabored breathing effort.  Heart: RRR, normal S1 and S2, no S3, no rub.  Abdomen: soft, non-tender, no palpable bladder, present bowel sounds to auscultation ×4.  No guarding or rigidity.  Extremities: no clubbing, cyanosis or edema.  Good range of motion actively and passively.  Symmetric muscle strength and development  Neuro: " normal speech and mental status.  Cranial nerves II through XII intact.  No anosmia. DTR 2+; proprioception intact.  No focal motor/sensory deficits.      ECG 12 Lead    Date/Time: 1/11/2022 11:51 AM  Performed by: Rodney Smith DO  Authorized by: Rodney Smith DO   Comparison: not compared with previous ECG   Rhythm: sinus rhythm  BPM: 69  Conduction: conduction normal  ST Segments: ST segments normal  T Waves: T waves normal  Other findings: left atrial abnormality    Clinical impression: normal ECG              Assessment and Plan      Assessment/Plan:   Diagnoses and all orders for this visit:    1. Essential hypertension (Primary)  -     CBC & Differential  -     Comprehensive Metabolic Panel  -     TSH  -     T4, Free  -     Iron Profile  -     Ferritin  -     Vitamin B12  -     Magnesium  -     Vitamin D 25 Hydroxy  -     ECG 12 Lead  -     NIFEdipine CC (ADALAT CC) 30 MG 24 hr tablet; Take 1 tablet by mouth Daily.  Dispense: 90 tablet; Refill: 0    2. Obstructive sleep apnea on CPAP    3. Malaise and fatigue  -     CBC & Differential  -     Comprehensive Metabolic Panel  -     TSH  -     T4, Free  -     Iron Profile  -     Ferritin  -     Vitamin B12  -     Magnesium  -     Vitamin D 25 Hydroxy  -     ECG 12 Lead    4. Attention deficit disorder (ADD) in adult    5. Need for prophylactic vaccination and inoculation against influenza  -     FluLaval/Fluarix/Fluzone >6 Months ([id number])    6. Hiatal hernia with GERD without esophagitis  -     pantoprazole (PROTONIX) 40 MG EC tablet; Take 1 tablet by mouth Daily.  Dispense: 90 tablet; Refill: 0    7. Esophageal spasm  -     NIFEdipine CC (ADALAT CC) 30 MG 24 hr tablet; Take 1 tablet by mouth Daily.  Dispense: 90 tablet; Refill: 0    8. Palpitations  -     CBC & Differential  -     Comprehensive Metabolic Panel  -     TSH  -     T4, Free  -     Iron Profile  -     Ferritin  -     Vitamin B12  -     Magnesium  -     Vitamin D 25 Hydroxy  -     ECG 12  Lead  -     NIFEdipine CC (ADALAT CC) 30 MG 24 hr tablet; Take 1 tablet by mouth Daily.  Dispense: 90 tablet; Refill: 0    9. Hypogonadism male      Patient elects to get his flu vaccination today.    I do think patient would benefit from a urology referral for alternative testosterone supplementation options.  He will need repeat free/total testosterone level and prolactin level prior to that appointment.  I will defer repeating these test until next follow-up visit.  Today we will utilize CBC/CMP/thyroid study/iron studies/B12/magnesium/vitamin D level evaluation.    I do suspect patient is suffering from esophageal spasms, certainly likely given his known history of hiatal hernia and reflux symptoms.  I have recommended a trial of nifedipine and treatment, beginning with 30 mg extended release tablet daily.  He will discontinue use of his losartan at this time, as this should also provide assistance in regulating his blood pressure.  I am hopeful that this will also aid in treatment of potential mild Raynaud's phenomenon.  Should patient develop any ill effects to the medication adjustment he is asked to notify the office for potential change to treatment regimen.  We can titrate the dosing of nifedipine as needed at follow-up visit.    EKG today is reassuring.  Vital signs demonstrate hemodynamic stability.    Adding PPI therapy to treatment regimen.    Continue efforts to improve/increase use of CPAP and treatment of his obstructive sleep apnea.  Keep scheduled follow-up appointment with sleep medicine/pulmonology.    Discussion Summary:    I spent 45 minutes caring for Robbin on this date of service. This time includes time spent by me in the following activities:preparing for the visit, reviewing tests, performing a medically appropriate examination and/or evaluation , counseling and educating the patient/family/caregiver, ordering medications, tests, or procedures, documenting information in the medical  record and care coordination    Discussed plan of care in detail with pt today; pt verb understanding and agrees.  Follow up:  Return in about 2 weeks (around 1/25/2022) for Recheck, Med Change/New Meds.     There are no Patient Instructions on file for this visit.    Rodney Smith,   01/12/22  11:09 EST          Please note that portions of this note may have been completed with a voice recognition program. Efforts were made to edit the dictations, but occasionally words are mistranscribed.

## 2022-01-12 LAB
25(OH)D3+25(OH)D2 SERPL-MCNC: 22.9 NG/ML (ref 30–100)
ALBUMIN SERPL-MCNC: 5.2 G/DL (ref 3.5–5.2)
ALBUMIN/GLOB SERPL: 2.3 G/DL
ALP SERPL-CCNC: 78 U/L (ref 39–117)
ALT SERPL-CCNC: 56 U/L (ref 1–41)
AST SERPL-CCNC: 29 U/L (ref 1–40)
BASOPHILS # BLD AUTO: 0.02 10*3/MM3 (ref 0–0.2)
BASOPHILS NFR BLD AUTO: 0.5 % (ref 0–1.5)
BILIRUB SERPL-MCNC: 0.4 MG/DL (ref 0–1.2)
BUN SERPL-MCNC: 15 MG/DL (ref 6–20)
BUN/CREAT SERPL: 20.8 (ref 7–25)
CALCIUM SERPL-MCNC: 9.8 MG/DL (ref 8.6–10.5)
CHLORIDE SERPL-SCNC: 99 MMOL/L (ref 98–107)
CO2 SERPL-SCNC: 31 MMOL/L (ref 22–29)
CREAT SERPL-MCNC: 0.72 MG/DL (ref 0.76–1.27)
EOSINOPHIL # BLD AUTO: 0.05 10*3/MM3 (ref 0–0.4)
EOSINOPHIL NFR BLD AUTO: 1.3 % (ref 0.3–6.2)
ERYTHROCYTE [DISTWIDTH] IN BLOOD BY AUTOMATED COUNT: 11.9 % (ref 12.3–15.4)
FERRITIN SERPL-MCNC: 364 NG/ML (ref 30–400)
GLOBULIN SER CALC-MCNC: 2.3 GM/DL
GLUCOSE SERPL-MCNC: 111 MG/DL (ref 65–99)
HCT VFR BLD AUTO: 43.7 % (ref 37.5–51)
HGB BLD-MCNC: 14.7 G/DL (ref 13–17.7)
IMM GRANULOCYTES # BLD AUTO: 0.02 10*3/MM3 (ref 0–0.05)
IMM GRANULOCYTES NFR BLD AUTO: 0.5 % (ref 0–0.5)
IRON SATN MFR SERPL: 27 % (ref 20–50)
IRON SERPL-MCNC: 110 MCG/DL (ref 59–158)
LYMPHOCYTES # BLD AUTO: 1.16 10*3/MM3 (ref 0.7–3.1)
LYMPHOCYTES NFR BLD AUTO: 29 % (ref 19.6–45.3)
MAGNESIUM SERPL-MCNC: 2.1 MG/DL (ref 1.6–2.6)
MCH RBC QN AUTO: 30.8 PG (ref 26.6–33)
MCHC RBC AUTO-ENTMCNC: 33.6 G/DL (ref 31.5–35.7)
MCV RBC AUTO: 91.6 FL (ref 79–97)
MONOCYTES # BLD AUTO: 0.29 10*3/MM3 (ref 0.1–0.9)
MONOCYTES NFR BLD AUTO: 7.3 % (ref 5–12)
NEUTROPHILS # BLD AUTO: 2.46 10*3/MM3 (ref 1.7–7)
NEUTROPHILS NFR BLD AUTO: 61.4 % (ref 42.7–76)
NRBC BLD AUTO-RTO: 0 /100 WBC (ref 0–0.2)
PLATELET # BLD AUTO: 237 10*3/MM3 (ref 140–450)
POTASSIUM SERPL-SCNC: 4.3 MMOL/L (ref 3.5–5.2)
PROT SERPL-MCNC: 7.5 G/DL (ref 6–8.5)
RBC # BLD AUTO: 4.77 10*6/MM3 (ref 4.14–5.8)
SODIUM SERPL-SCNC: 140 MMOL/L (ref 136–145)
T4 FREE SERPL-MCNC: 1.2 NG/DL (ref 0.93–1.7)
TIBC SERPL-MCNC: 414 MCG/DL
TSH SERPL DL<=0.005 MIU/L-ACNC: 2.9 UIU/ML (ref 0.27–4.2)
UIBC SERPL-MCNC: 304 MCG/DL (ref 112–346)
VIT B12 SERPL-MCNC: 743 PG/ML (ref 211–946)
WBC # BLD AUTO: 4 10*3/MM3 (ref 3.4–10.8)

## 2022-01-12 RX ORDER — PANTOPRAZOLE SODIUM 40 MG/1
40 TABLET, DELAYED RELEASE ORAL DAILY
Qty: 90 TABLET | Refills: 0 | Status: SHIPPED | OUTPATIENT
Start: 2022-01-12 | End: 2023-01-26

## 2022-01-21 ENCOUNTER — PATIENT ROUNDING (BHMG ONLY) (OUTPATIENT)
Dept: FAMILY MEDICINE CLINIC | Facility: CLINIC | Age: 42
End: 2022-01-21

## 2022-01-28 ENCOUNTER — TELEPHONE (OUTPATIENT)
Dept: FAMILY MEDICINE CLINIC | Facility: CLINIC | Age: 42
End: 2022-01-28

## 2022-01-28 RX ORDER — AMLODIPINE BESYLATE 5 MG/1
5 TABLET ORAL DAILY
Qty: 30 TABLET | Refills: 0 | Status: SHIPPED | OUTPATIENT
Start: 2022-01-28 | End: 2022-02-04 | Stop reason: SDUPTHER

## 2022-01-28 NOTE — TELEPHONE ENCOUNTER
Caller: Robbin Arteaga    Relationship: Self    Best call back number: [phone]    What medications are you currently taking:   Current Outpatient Medications on File Prior to Visit   Medication Sig Dispense Refill   • losartan (COZAAR) 50 MG tablet Take 1 tablet by mouth Daily. Needs appointment for further refills. 30 tablet 0   • NIFEdipine CC (ADALAT CC) 30 MG 24 hr tablet Take 1 tablet by mouth Daily. 90 tablet 0   • pantoprazole (PROTONIX) 40 MG EC tablet Take 1 tablet by mouth Daily. 90 tablet 0   • polyethylene glycol (MIRALAX) 17 GM/SCOOP powder 17 g po daily prn constipaton 500 g 0   • SUBOXONE 8-2 MG film film take 2 FILMs under the tongue once daily  0     No current facility-administered medications on file prior to visit.          When did you start taking these medications: 01/11/2022    Which medication are you concerned about: NIFEDIPINE    Who prescribed you this medication: DR MCCORD    What are your concerns: AS SOON AS PATIENT STARTED TAKING THIS MEDICATION HE GETS HORRIBLE HEADACHES AND FACE GETS FLUSH AND EARS TURN RED. WOULD LIKE TO GET A DIFFERENT BLOOD PRESSURE MEDICATION. PLEASE ADVISE

## 2022-02-04 ENCOUNTER — OFFICE VISIT (OUTPATIENT)
Dept: FAMILY MEDICINE CLINIC | Facility: CLINIC | Age: 42
End: 2022-02-04

## 2022-02-04 VITALS
TEMPERATURE: 98.1 F | RESPIRATION RATE: 14 BRPM | HEART RATE: 81 BPM | SYSTOLIC BLOOD PRESSURE: 138 MMHG | OXYGEN SATURATION: 98 % | DIASTOLIC BLOOD PRESSURE: 90 MMHG | HEIGHT: 67 IN | BODY MASS INDEX: 32.49 KG/M2 | WEIGHT: 207 LBS

## 2022-02-04 DIAGNOSIS — I10 ESSENTIAL HYPERTENSION: Primary | ICD-10-CM

## 2022-02-04 DIAGNOSIS — G47.33 OBSTRUCTIVE SLEEP APNEA ON CPAP: ICD-10-CM

## 2022-02-04 DIAGNOSIS — K44.9 HIATAL HERNIA WITH GERD WITHOUT ESOPHAGITIS: ICD-10-CM

## 2022-02-04 DIAGNOSIS — K22.4 ESOPHAGEAL SPASM: ICD-10-CM

## 2022-02-04 DIAGNOSIS — K21.9 HIATAL HERNIA WITH GERD WITHOUT ESOPHAGITIS: ICD-10-CM

## 2022-02-04 DIAGNOSIS — Z99.89 OBSTRUCTIVE SLEEP APNEA ON CPAP: ICD-10-CM

## 2022-02-04 PROCEDURE — 99213 OFFICE O/P EST LOW 20 MIN: CPT | Performed by: FAMILY MEDICINE

## 2022-02-04 RX ORDER — AMLODIPINE BESYLATE 2.5 MG/1
2.5 TABLET ORAL DAILY
Qty: 90 TABLET | Refills: 1 | Status: SHIPPED | OUTPATIENT
Start: 2022-02-04 | End: 2022-03-07

## 2022-02-04 NOTE — PROGRESS NOTES
"                      Established Patient        Chief Complaint:   Chief Complaint   Patient presents with   • Follow-up   • Hypertension        Robbin Arteaga is a 41 y.o. male    History of Present Illness:     Pt not currently taking any of his anti-HTN meds; we stopped losartan at previous visit and started pt on nifedipine; was unable to tolerate the nifedipine, so new Rx for amlodipine was sent to his pharmacy.      Subjective     The patient has consented to being recorded using MAX.    The patient is in for scheduled follow-up visit of his hypertension, hiatal hernia with gastroesophageal reflux disease, and esophageal spasms.    The patient reports denies taking his blood pressure medication in 9 days and he reports a blood pressure reading of 126/74 this morning. He adds that he has not started taking amlodipine secondary to learning that amlodipine could harm the liver and this concerned him. The patient also inquires why his blood pressure seems to fluctuate. He adds there was a 2 day period approximately 1 month ago where his blood pressure was elevated and he endorses feeling his He reports that he can feel his \"ears get red and his face flushes\"; however, his blood pressure readings will be normal for approximately 2 weeks.     The following portions of the patient's history were reviewed and updated as appropriate: allergies, current medications, past family history, past medical history, past social history, past surgical history and problem list.    Allergies   Allergen Reactions   • Penicillins Other (See Comments)     Pt states it happened when he was a kid and he is unsure of the reaction        Review of Systems  1. Constitutional: Negative for fever. Negative for chills, diaphoresis, fatigue and unexpected weight change.   2. HENT: No dysphagia; no changes to vision/hearing/smell/taste; no epistaxis  3. Eyes: Negative for redness and visual disturbance.   4. Respiratory: negative for " "shortness of breath. Negative for chest pain . Negative for cough and chest tightness.   5. Cardiovascular: Negative for chest pain and palpitations.   6. Gastrointestinal: Negative for abdominal distention, abdominal pain and blood in stool.   7. Endocrine: Negative for cold intolerance and heat intolerance.   8. Genitourinary: Negative for difficulty urinating, dysuria and frequency.   9. Musculoskeletal: Negative for arthralgias, back pain and myalgias.   10. Skin: Negative for color change, rash and wound.   11. Neurological: Negative for syncope, weakness and headaches.   12. Hematological: Negative for adenopathy. Does not bruise/bleed easily.   13. Psychiatric/Behavioral: Negative for confusion. The patient is not nervous/anxious.    Objective     Physical Exam   Vital Signs: /90 (BP Location: Right arm, Patient Position: Sitting, Cuff Size: Adult)   Pulse 81   Temp 98.1 °F (36.7 °C)   Resp 14   Ht 170.2 cm (67\")   Wt 93.9 kg (207 lb)   SpO2 98%   BMI 32.42 kg/m²     General Appearance: alert, oriented x 3, no acute distress.  Skin: warm and dry.   HEENT: Atraumatic.  pupils round and reactive to light and accommodation, oral mucosa pink and moist.  Nares patent without epistaxis.  External auditory canals are patent tympanic membranes intact.  Neck: supple, no JVD, trachea midline.  No thyromegaly  Lungs: CTA, unlabored breathing effort.  Heart: RRR, normal S1 and S2, no S3, no rub.  Abdomen: soft, non-tender, no palpable bladder, present bowel sounds to auscultation ×4.  No guarding or rigidity.  Extremities: no clubbing, cyanosis or edema.  Good range of motion actively and passively.  Symmetric muscle strength and development  Neuro: normal speech and mental status.  Cranial nerves II through XII intact.  No anosmia. DTR 2+; proprioception intact.  No focal motor/sensory deficits.    Assessment and Plan      Assessment/Plan:   Diagnoses and all orders for this visit:    1. Essential " hypertension (Primary)  -     amLODIPine (Norvasc) 2.5 MG tablet; Take 1 tablet by mouth Daily.  Dispense: 90 tablet; Refill: 1  - The above prescription reflects the dosage change discussed today.     2. Hiatal hernia with GERD without esophagitis  -     amLODIPine (Norvasc) 2.5 MG tablet; Take 1 tablet by mouth Daily.  Dispense: 90 tablet; Refill: 1    3. Esophageal spasm  -     amLODIPine (Norvasc) 2.5 MG tablet; Take 1 tablet by mouth Daily.  Dispense: 90 tablet; Refill: 1  - The above prescription reflects the dosage change discussed today.       Decrease dosing of amlodipine to 2.5 mg; will follow clinically.  I have stressed the importance of consistency/compliance with dosing.  I have asked that he monitor his blood pressure routinely at home additionally.    Discussed need for reduction in sodium/salt/caffeine intake; improve sleep habits as able; inc formal CV exercise program with goal of vigorous activity most, if not all, days of the week; goal of 250 min of sustained HR CV exercise total per week.    Continue PPI therapy.  Continue dietary/lifestyle medications to aid in symptom management of his GERD.    Have discussed most recent laboratory evaluation with him in detail today.  Have answered all of his questions.    I have stressed the importance of compliance with NIPPV and treatment of his obstructive sleep apnea.  Patient states he will make a more concerted effort to be compliant with its use.      Discussion Summary:    I spent 25 minutes caring for Robbin on this date of service. This time includes time spent by me in the following activities:preparing for the visit, performing a medically appropriate examination and/or evaluation , counseling and educating the patient/family/caregiver, ordering medications, tests, or procedures, documenting information in the medical record and care coordination    Discussed plan of care in detail with pt today; pt verb understanding and agrees.  Follow  up:  Return in about 4 weeks (around 3/4/2022) for Recheck, Med Change/New Meds.     There are no Patient Instructions on file for this visit.    Rodney Smith DO  02/04/22  13:36 EST          Please note that portions of this note may have been completed with a voice recognition program. Efforts were made to edit the dictations, but occasionally words are mistranscribed.

## 2022-03-07 ENCOUNTER — OFFICE VISIT (OUTPATIENT)
Dept: FAMILY MEDICINE CLINIC | Facility: CLINIC | Age: 42
End: 2022-03-07

## 2022-03-07 VITALS
SYSTOLIC BLOOD PRESSURE: 150 MMHG | WEIGHT: 205 LBS | BODY MASS INDEX: 31.07 KG/M2 | DIASTOLIC BLOOD PRESSURE: 80 MMHG | HEIGHT: 68 IN | HEART RATE: 96 BPM | OXYGEN SATURATION: 97 % | TEMPERATURE: 97.2 F

## 2022-03-07 DIAGNOSIS — K44.9 HIATAL HERNIA WITH GERD WITHOUT ESOPHAGITIS: Primary | ICD-10-CM

## 2022-03-07 DIAGNOSIS — I10 ESSENTIAL HYPERTENSION: ICD-10-CM

## 2022-03-07 DIAGNOSIS — K21.9 HIATAL HERNIA WITH GERD WITHOUT ESOPHAGITIS: Primary | ICD-10-CM

## 2022-03-07 PROCEDURE — 99214 OFFICE O/P EST MOD 30 MIN: CPT | Performed by: NURSE PRACTITIONER

## 2022-03-07 RX ORDER — LOSARTAN POTASSIUM 50 MG/1
50 TABLET ORAL DAILY
Qty: 30 TABLET | Refills: 3 | Status: SHIPPED | OUTPATIENT
Start: 2022-03-07 | End: 2022-05-25

## 2022-03-07 NOTE — PROGRESS NOTES
"      Subjective     Chief Complaint:    Chief Complaint   Patient presents with   • Hypertension       History of Present Illness:   Normally see's Dr. Smith. Was seen last month and had workup. He was having trouble with BP.  Historically has done well on losartan.  His PCP changed him to nifedipine to help with Raynaud's type symptoms as well as potential for esophageal spasms.  This caused patient to have headaches which prompted transitioning medication to amlodipine.  Patient notes he has been taking amlodipine even and doses as high as 10 mg.  Still feels like blood pressure is uncontrolled.  Did not help his chest pain type symptoms.  He does have history of hiatal hernia.  Has been using PPI as needed.. He then got covid and has not felt well since then. He checks his BP at home and it is ranging from 130-140/90.     Review of Systems  Gen- No fevers, chills  CV- No chest pain, palpitations  Resp- No cough, dyspnea  GI- No N/V/D, abd pain  Neuro-No dizziness, headaches      I have reviewed and/or updated the patient's past medical, surgical, family, social history and problem list as appropriate.     Medications:    Current Outpatient Medications:   •  losartan (COZAAR) 50 MG tablet, Take 1 tablet by mouth Daily., Disp: 30 tablet, Rfl: 3  •  pantoprazole (PROTONIX) 40 MG EC tablet, Take 1 tablet by mouth Daily., Disp: 90 tablet, Rfl: 0  •  polyethylene glycol (MIRALAX) 17 GM/SCOOP powder, 17 g po daily prn constipaton, Disp: 500 g, Rfl: 0  •  SUBOXONE 8-2 MG film film, take 2 FILMs under the tongue once daily, Disp: , Rfl: 0    Allergies:  Allergies   Allergen Reactions   • Penicillins Other (See Comments)     Pt states it happened when he was a kid and he is unsure of the reaction        Objective     Vital Signs:   Vitals:    03/07/22 1448   BP: 150/80   Pulse: 96   Temp: 97.2 °F (36.2 °C)   SpO2: 97%   Weight: 93 kg (205 lb)   Height: 172.7 cm (68\")   PainSc: 0-No pain     Body mass index is 31.17 " kg/m².    Physical Exam:    Physical Exam  Vitals and nursing note reviewed.   Constitutional:       Appearance: He is well-developed.   HENT:      Head: Normocephalic and atraumatic.   Eyes:      Pupils: Pupils are equal, round, and reactive to light.   Cardiovascular:      Rate and Rhythm: Normal rate and regular rhythm.      Heart sounds: Normal heart sounds.   Pulmonary:      Effort: Pulmonary effort is normal.      Breath sounds: Normal breath sounds.   Abdominal:      General: Bowel sounds are normal. There is no distension.      Palpations: Abdomen is soft.      Tenderness: There is no abdominal tenderness.   Musculoskeletal:      Cervical back: Neck supple.   Skin:     General: Skin is warm and dry.   Neurological:      Mental Status: He is alert and oriented to person, place, and time.   Psychiatric:         Behavior: Behavior normal.         Assessment / Plan     Assessment/Plan:   Problem List Items Addressed This Visit        Cardiac and Vasculature    Essential hypertension    Relevant Medications    losartan (COZAAR) 50 MG tablet       Gastrointestinal Abdominal     Hiatal hernia with GERD without esophagitis - Primary    Relevant Medications    pantoprazole (PROTONIX) 40 MG EC tablet        --Stop Norvasc.  Restart losartan. Meds, diet, and lifestyle recommendation discussed. Home BP monitoring encouraged and appropriate intervals discussed.   --Needs PPI daily instead of as needed    Follow up:  Return in about 3 months (around 6/7/2022).    Electronically signed by DEBBI Bledsoe   03/07/2022 15:17 EST      Please note that portions of this note may have been completed with a voice recognition program. Efforts were made to edit the dictations, but occasionally words are mistranscribed.

## 2022-03-24 ENCOUNTER — OFFICE VISIT (OUTPATIENT)
Dept: PULMONOLOGY | Facility: CLINIC | Age: 42
End: 2022-03-24

## 2022-03-24 VITALS
OXYGEN SATURATION: 99 % | HEART RATE: 83 BPM | BODY MASS INDEX: 30.62 KG/M2 | HEIGHT: 68 IN | DIASTOLIC BLOOD PRESSURE: 80 MMHG | WEIGHT: 202 LBS | SYSTOLIC BLOOD PRESSURE: 124 MMHG

## 2022-03-24 DIAGNOSIS — E66.9 OBESITY (BMI 30-39.9): ICD-10-CM

## 2022-03-24 DIAGNOSIS — G47.33 OSA (OBSTRUCTIVE SLEEP APNEA): Primary | ICD-10-CM

## 2022-03-24 PROCEDURE — 99212 OFFICE O/P EST SF 10 MIN: CPT | Performed by: NURSE PRACTITIONER

## 2022-05-25 ENCOUNTER — OFFICE VISIT (OUTPATIENT)
Dept: FAMILY MEDICINE CLINIC | Facility: CLINIC | Age: 42
End: 2022-05-25

## 2022-05-25 VITALS
DIASTOLIC BLOOD PRESSURE: 84 MMHG | TEMPERATURE: 97.9 F | BODY MASS INDEX: 28.61 KG/M2 | OXYGEN SATURATION: 94 % | HEIGHT: 68 IN | HEART RATE: 80 BPM | RESPIRATION RATE: 16 BRPM | SYSTOLIC BLOOD PRESSURE: 128 MMHG | WEIGHT: 188.8 LBS

## 2022-05-25 DIAGNOSIS — H92.02 LEFT EAR PAIN: Primary | ICD-10-CM

## 2022-05-25 DIAGNOSIS — H61.22 LEFT EAR IMPACTED CERUMEN: ICD-10-CM

## 2022-05-25 PROCEDURE — 99213 OFFICE O/P EST LOW 20 MIN: CPT | Performed by: NURSE PRACTITIONER

## 2022-05-25 RX ORDER — OFLOXACIN 3 MG/ML
5 SOLUTION/ DROPS OPHTHALMIC DAILY
Qty: 5 ML | Refills: 0 | Status: SHIPPED | OUTPATIENT
Start: 2022-05-25 | End: 2022-06-01

## 2022-05-25 NOTE — PROGRESS NOTES
"      Subjective     Chief Complaint:    Chief Complaint   Patient presents with   • Earache     LEFT EAR  TROUBLE SWALLOWING        History of Present Illness:   Left ear has felt clogged. Worsening. Notes decreased hearing. A few days ago he had some trouble swallowing liquids but that is getting some better. He does have hiatal hernia.   No runny nose or stuffy nose. No sore throat.   Had some cold sweats earlier in the week but nothing else  Has not taken any meds      Review of Systems  Gen- No fevers, chills  CV- No chest pain, palpitations  Resp- No cough, dyspnea  GI- No N/V/D, abd pain  Neuro-No dizziness, headaches      I have reviewed and/or updated the patient's past medical, surgical, family, social history and problem list as appropriate.     Medications:    Current Outpatient Medications:   •  pantoprazole (PROTONIX) 40 MG EC tablet, Take 1 tablet by mouth Daily., Disp: 90 tablet, Rfl: 0  •  polyethylene glycol (MIRALAX) 17 GM/SCOOP powder, 17 g po daily prn constipaton, Disp: 500 g, Rfl: 0  •  SUBOXONE 8-2 MG film film, take 2 FILMs under the tongue once daily, Disp: , Rfl: 0  •  ofloxacin (FLOXIN) 0.3 % otic solution, Administer 5 drops into the left ear Daily for 7 days., Disp: 5 mL, Rfl: 0    Allergies:  Allergies   Allergen Reactions   • Penicillins Other (See Comments)     Pt states it happened when he was a kid and he is unsure of the reaction        Objective     Vital Signs:   Vitals:    05/25/22 1306   BP: 128/84   Pulse: 80   Resp: 16   Temp: 97.9 °F (36.6 °C)   SpO2: 94%   Weight: 85.6 kg (188 lb 12.8 oz)   Height: 172.7 cm (68\")     Body mass index is 28.71 kg/m².    Physical Exam:    Physical Exam  Constitutional:       Appearance: Normal appearance. He is well-developed.   HENT:      Head: Normocephalic and atraumatic.      Right Ear: Tympanic membrane, ear canal and external ear normal.      Left Ear: Tympanic membrane, ear canal and external ear normal.      Ears:      Comments: Left " ear canal with cerumen impaction removed with irrigation, left TM normal. Left canal with redness and abnormal area of concern. This has white pimple like bump noted on top of ear canal     Nose: Nose normal.      Mouth/Throat:      Pharynx: Uvula midline.   Eyes:      Pupils: Pupils are equal, round, and reactive to light.   Cardiovascular:      Rate and Rhythm: Normal rate and regular rhythm.      Heart sounds: Normal heart sounds. No murmur heard.    No friction rub. No gallop.   Pulmonary:      Effort: Pulmonary effort is normal.      Breath sounds: Normal breath sounds.   Abdominal:      General: Bowel sounds are normal.      Palpations: Abdomen is soft.      Tenderness: There is no abdominal tenderness.   Musculoskeletal:      Cervical back: Neck supple.   Lymphadenopathy:      Head:      Right side of head: No submental, submandibular, tonsillar, preauricular or posterior auricular adenopathy.      Left side of head: No submental, submandibular, tonsillar, preauricular or posterior auricular adenopathy.      Cervical: No cervical adenopathy.   Skin:     General: Skin is warm and dry.   Neurological:      General: No focal deficit present.      Mental Status: He is alert and oriented to person, place, and time.   Psychiatric:         Mood and Affect: Mood normal.         Behavior: Behavior normal.         Assessment / Plan     Assessment/Plan:   Problem List Items Addressed This Visit    None     Visit Diagnoses     Left ear pain    -  Primary    Left ear impacted cerumen            -- impaction removed will use ofloxacin drops due to redness and pimple like structure in canal. Avoid q-tips. RTC in 2 weeks for ear recheck    Follow up:  2 weeks    Electronically signed by DEBBI Bledsoe   05/25/2022 13:17 EDT      Please note that portions of this note may have been completed with a voice recognition program. Efforts were made to edit the dictations, but occasionally words are mistranscribed.

## 2022-06-16 ENCOUNTER — OFFICE VISIT (OUTPATIENT)
Dept: FAMILY MEDICINE CLINIC | Facility: CLINIC | Age: 42
End: 2022-06-16

## 2022-06-16 VITALS
DIASTOLIC BLOOD PRESSURE: 74 MMHG | HEIGHT: 68 IN | RESPIRATION RATE: 18 BRPM | WEIGHT: 185 LBS | HEART RATE: 78 BPM | SYSTOLIC BLOOD PRESSURE: 122 MMHG | OXYGEN SATURATION: 96 % | BODY MASS INDEX: 28.04 KG/M2 | TEMPERATURE: 98.1 F

## 2022-06-16 DIAGNOSIS — R73.01 IMPAIRED FASTING GLUCOSE: ICD-10-CM

## 2022-06-16 DIAGNOSIS — E55.9 VITAMIN D DEFICIENCY: ICD-10-CM

## 2022-06-16 DIAGNOSIS — M25.561 ARTHRALGIA OF RIGHT KNEE: ICD-10-CM

## 2022-06-16 DIAGNOSIS — K44.9 HIATAL HERNIA WITH GERD WITHOUT ESOPHAGITIS: ICD-10-CM

## 2022-06-16 DIAGNOSIS — K21.9 HIATAL HERNIA WITH GERD WITHOUT ESOPHAGITIS: ICD-10-CM

## 2022-06-16 DIAGNOSIS — K22.4 ESOPHAGEAL SPASM: ICD-10-CM

## 2022-06-16 DIAGNOSIS — F41.9 ANXIETY: ICD-10-CM

## 2022-06-16 DIAGNOSIS — M12.561 TRAUMATIC ARTHROPATHY, RIGHT KNEE: ICD-10-CM

## 2022-06-16 DIAGNOSIS — I10 ESSENTIAL HYPERTENSION: Primary | ICD-10-CM

## 2022-06-16 LAB
EXPIRATION DATE: NORMAL
HBA1C MFR BLD: 5.3 %
Lab: NORMAL

## 2022-06-16 PROCEDURE — 99214 OFFICE O/P EST MOD 30 MIN: CPT | Performed by: FAMILY MEDICINE

## 2022-06-16 PROCEDURE — 83036 HEMOGLOBIN GLYCOSYLATED A1C: CPT | Performed by: FAMILY MEDICINE

## 2022-06-16 RX ORDER — MELOXICAM 7.5 MG/1
7.5 TABLET ORAL DAILY
Qty: 90 TABLET | Refills: 2 | Status: SHIPPED | OUTPATIENT
Start: 2022-06-16 | End: 2023-01-26

## 2022-06-16 RX ORDER — ERGOCALCIFEROL 1.25 MG/1
50000 CAPSULE ORAL WEEKLY
Qty: 8 CAPSULE | Refills: 0 | Status: SHIPPED | OUTPATIENT
Start: 2022-06-16 | End: 2022-08-18 | Stop reason: SDUPTHER

## 2022-06-16 NOTE — PROGRESS NOTES
Established Patient        Chief Complaint:   Chief Complaint   Patient presents with   • Follow-up   • Hypertension        Robbin Arteaga is a 41 y.o. male    History of Present Illness:   Here today in scheduled follow-up visit of his hypertension, hiatal hernia, esophageal spasms, traumatic arthropathy of the right knee.    Patient reports increase in the discomfort to his right knee, worsened with certain activities.  Denies any new injuries or trauma.  Does have a history of traumatic injury requiring extensive surgical intervention.  Denies any overlying skin changes.    He denies any chest pain, syncope, palpitations or vertigo.  Does admit to some increase in malaise/fatigue, has a history of impaired fasting glucose.  He denies any polydipsia, polyuria or polyphagia.  He is not currently utilizing amlodipine.    Subjective     The following portions of the patient's history were reviewed and updated as appropriate: allergies, current medications, past family history, past medical history, past social history, past surgical history and problem list.    Allergies   Allergen Reactions   • Penicillins Other (See Comments)     Pt states it happened when he was a kid and he is unsure of the reaction        Review of Systems  1. Constitutional: Negative for fever. Negative for chills, diaphoresis; malaise/fatigue as per above, no unexpected weight change.  2. HENT: No dysphagia; no changes to vision/hearing/smell/taste; no epistaxis  3. Eyes: Negative for redness and visual disturbance.   4. Respiratory: negative for shortness of breath. Negative for chest pain . Negative for cough and chest tightness.   5. Cardiovascular: Negative for chest pain and palpitations.   6. Gastrointestinal: Negative for abdominal distention, abdominal pain and blood in stool.   7. Endocrine: Negative for cold intolerance and heat intolerance.   8. Genitourinary: Negative for difficulty urinating, dysuria and  "frequency.   9. Musculoskeletal: Right knee arthralgia as per above.  10. Skin: Negative for color change, rash and wound.   11. Neurological: Negative for syncope, weakness and headaches.   12. Hematological: Negative for adenopathy. Does not bruise/bleed easily.   13. Psychiatric/Behavioral: Negative for confusion. The patient is not nervous/anxious.    Objective     Physical Exam   Vital Signs: /74   Pulse 78   Temp 98.1 °F (36.7 °C)   Resp 18   Ht 172.7 cm (68\")   Wt 83.9 kg (185 lb)   SpO2 96%   BMI 28.13 kg/m²   BMI is >= 25 and <30. (Overweight) The following options were offered after discussion;: weight loss educational material (shared in after visit summary), exercise counseling/recommendations and nutrition counseling/recommendations    General Appearance: alert, oriented x 3, no acute distress.  Skin: warm and dry.  Extensive postsurgical scarring noted to the right knee.  HEENT: Atraumatic.  pupils round and reactive to light and accommodation, oral mucosa pink and moist.  Nares patent without epistaxis.  External auditory canals are patent tympanic membranes intact.  Neck: supple, no JVD, trachea midline.  No thyromegaly  Lungs: CTA, unlabored breathing effort.  Heart: RRR, normal S1 and S2, no S3, no rub.  Abdomen: soft, non-tender, no palpable bladder, present bowel sounds to auscultation ×4.  No guarding or rigidity.  Extremities: no clubbing, cyanosis or edema.  Good range of motion actively and passively.  Symmetric muscle strength and development.  Ambulates independently.  There is crepitance on A/P ROM of the right knee, mild medial/lateral joint line tenderness additionally.  No appreciable ligamentous laxity noted.  Rob/Marte sign negative.  Normal dorsiflexion/plantarflexion of bilateral feet.  Neuro: normal speech and mental status.  Cranial nerves II through XII intact.  No anosmia. DTR 2+; proprioception intact.  No focal motor/sensory deficits.    Assessment and Plan  "     Assessment/Plan:   Diagnoses and all orders for this visit:    1. Essential hypertension (Primary)    2. Hiatal hernia with GERD without esophagitis    3. Esophageal spasm    4. Impaired fasting glucose  -     POC Glycosylated Hemoglobin (Hb A1C)    5. Traumatic arthropathy, right knee  -     meloxicam (Mobic) 7.5 MG tablet; Take 1 tablet by mouth Daily.  Dispense: 90 tablet; Refill: 2    6. Arthralgia of right knee  -     meloxicam (Mobic) 7.5 MG tablet; Take 1 tablet by mouth Daily.  Dispense: 90 tablet; Refill: 2    Repeat Vit D level at completion of 8 weeks supplementation.  Continue balanced dietary intake.  Maintain natural sunlight exposure as best able, sunscreen while exposed to the direct sun.    Start daily meloxicam; will follow clinically.  I have asked that he notify the office if he develops any ill effects of the medication.    VSS, appears HD asymptomatic.    Normal hemoglobin A1c.  I stressed the importance of avoiding prolonged fasting periods, as well as the need to maintain proper hydration status.    Continue dietary/lifestyle modifications to aid in symptom management of his chronic GERD.  Continue PPI therapy.    Discussion Summary:    I spent 30 minutes caring for Robbin on this date of service. This time includes time spent by me in the following activities:preparing for the visit, performing a medically appropriate examination and/or evaluation , counseling and educating the patient/family/caregiver, ordering medications, tests, or procedures, documenting information in the medical record and care coordination    Discussed plan of care in detail with pt today; pt verb understanding and agrees.  Follow up:  No follow-ups on file.     There are no Patient Instructions on file for this visit.    Rodney Smith DO  06/16/22  08:49 EDT          Please note that portions of this note may have been completed with a voice recognition program. Efforts were made to edit the dictations, but  occasionally words are mistranscribed.

## 2022-06-30 ENCOUNTER — TELEPHONE (OUTPATIENT)
Dept: FAMILY MEDICINE CLINIC | Facility: CLINIC | Age: 42
End: 2022-06-30

## 2022-07-01 ENCOUNTER — TELEPHONE (OUTPATIENT)
Dept: FAMILY MEDICINE CLINIC | Facility: CLINIC | Age: 42
End: 2022-07-01

## 2022-07-01 DIAGNOSIS — I10 ESSENTIAL HYPERTENSION: ICD-10-CM

## 2022-07-01 RX ORDER — LOSARTAN POTASSIUM 50 MG/1
50 TABLET ORAL DAILY
Qty: 90 TABLET | Refills: 1 | Status: SHIPPED | OUTPATIENT
Start: 2022-07-01 | End: 2022-07-07 | Stop reason: SDUPTHER

## 2022-07-01 NOTE — TELEPHONE ENCOUNTER
Ok to fill?    It looks like this medication was removed from the patients medication list and marked as therapy complete.

## 2022-07-07 DIAGNOSIS — I10 ESSENTIAL HYPERTENSION: ICD-10-CM

## 2022-07-07 RX ORDER — LOSARTAN POTASSIUM 50 MG/1
50 TABLET ORAL DAILY
Qty: 90 TABLET | Refills: 1 | Status: SHIPPED | OUTPATIENT
Start: 2022-07-07 | End: 2023-01-26 | Stop reason: ALTCHOICE

## 2022-08-18 ENCOUNTER — OFFICE VISIT (OUTPATIENT)
Dept: FAMILY MEDICINE CLINIC | Facility: CLINIC | Age: 42
End: 2022-08-18

## 2022-08-18 VITALS
HEIGHT: 68 IN | DIASTOLIC BLOOD PRESSURE: 68 MMHG | WEIGHT: 180.2 LBS | RESPIRATION RATE: 16 BRPM | SYSTOLIC BLOOD PRESSURE: 126 MMHG | OXYGEN SATURATION: 97 % | TEMPERATURE: 97.2 F | BODY MASS INDEX: 27.31 KG/M2 | HEART RATE: 60 BPM

## 2022-08-18 DIAGNOSIS — G47.33 OBSTRUCTIVE SLEEP APNEA ON CPAP: ICD-10-CM

## 2022-08-18 DIAGNOSIS — Z99.89 OBSTRUCTIVE SLEEP APNEA ON CPAP: ICD-10-CM

## 2022-08-18 DIAGNOSIS — K21.9 HIATAL HERNIA WITH GERD WITHOUT ESOPHAGITIS: ICD-10-CM

## 2022-08-18 DIAGNOSIS — K44.9 HIATAL HERNIA WITH GERD WITHOUT ESOPHAGITIS: ICD-10-CM

## 2022-08-18 DIAGNOSIS — I10 ESSENTIAL HYPERTENSION: Primary | ICD-10-CM

## 2022-08-18 DIAGNOSIS — E55.9 VITAMIN D DEFICIENCY: ICD-10-CM

## 2022-08-18 PROBLEM — E66.811 CLASS 1 OBESITY WITHOUT SERIOUS COMORBIDITY WITH BODY MASS INDEX (BMI) OF 32.0 TO 32.9 IN ADULT: Status: RESOLVED | Noted: 2017-11-28 | Resolved: 2022-08-18

## 2022-08-18 PROBLEM — E66.9 CLASS 1 OBESITY WITHOUT SERIOUS COMORBIDITY WITH BODY MASS INDEX (BMI) OF 32.0 TO 32.9 IN ADULT: Status: RESOLVED | Noted: 2017-11-28 | Resolved: 2022-08-18

## 2022-08-18 PROCEDURE — 99214 OFFICE O/P EST MOD 30 MIN: CPT | Performed by: FAMILY MEDICINE

## 2022-08-18 RX ORDER — ERGOCALCIFEROL 1.25 MG/1
50000 CAPSULE ORAL WEEKLY
Qty: 8 CAPSULE | Refills: 3 | Status: SHIPPED | OUTPATIENT
Start: 2022-08-18

## 2022-08-18 NOTE — PROGRESS NOTES
Established Patient        Chief Complaint:   Chief Complaint   Patient presents with   • Hypertension     2 MONTH FOLLOW UP         Robbin Arteaga is a 41 y.o. male    History of Present Illness:   Here today in scheduled follow-up visit of his hypertension, GERD, obstructive sleep apnea and vitamin D deficiency.    No longer using CPAP d/t wt loss efforts.  He states his overall energy level has improved with intended weight loss, a result of dietary/lifestyle/exercise modifications.    He denies any chest pain, syncope, palpitations or vertigo.  Denies any aspiration or dysphagia.  Denies BRB/BTS.  Maintains an active lifestyle, balanced dietary intake.      Subjective     The following portions of the patient's history were reviewed and updated as appropriate: allergies, current medications, past family history, past medical history, past social history, past surgical history and problem list.    Allergies   Allergen Reactions   • Penicillins Other (See Comments)     Pt states it happened when he was a kid and he is unsure of the reaction        Review of Systems  1. Constitutional: Negative for fever. Negative for chills, diaphoresis.  Improved malaise/fatigue as per above, as well as intended weight loss.  2. HENT: No dysphagia; no changes to vision/hearing/smell/taste; no epistaxis  3. Eyes: Negative for redness and visual disturbance.   4. Respiratory: negative for shortness of breath. Negative for chest pain . Negative for cough and chest tightness.   5. Cardiovascular: Negative for chest pain and palpitations.   6. Gastrointestinal: Negative for abdominal distention, abdominal pain and blood in stool.   7. Endocrine: Negative for cold intolerance and heat intolerance.   8. Genitourinary: Negative for difficulty urinating, dysuria and frequency.   9. Musculoskeletal: Denies new injuries or trauma.  10. Skin: Negative for color change, rash and wound.   11. Neurological: Negative  "for syncope, weakness and headaches.   12. Hematological: Negative for adenopathy. Does not bruise/bleed easily.   13. Psychiatric/Behavioral: Negative for confusion. The patient is not nervous/anxious.    Objective     Physical Exam   Vital Signs: /68   Pulse 60   Temp 97.2 °F (36.2 °C)   Resp 16   Ht 172.7 cm (68\")   Wt 81.7 kg (180 lb 3.2 oz)   SpO2 97%   BMI 27.40 kg/m²   BMI is >= 25 and <30. (Overweight) The following options were offered after discussion;: weight loss educational material (shared in after visit summary), exercise counseling/recommendations and nutrition counseling/recommendations    General Appearance: alert, oriented x 3, no acute distress.  Skin: warm and dry.  Extensive postsurgical scarring noted to the right knee.  HEENT: Atraumatic.  pupils round and reactive to light and accommodation, oral mucosa pink and moist.  Nares patent without epistaxis.  External auditory canals are patent tympanic membranes intact.  Neck: supple, no JVD, trachea midline.  No thyromegaly  Lungs: CTA, unlabored breathing effort.  Heart: RRR, normal S1 and S2, no S3, no rub.  Abdomen: soft, non-tender, no palpable bladder, present bowel sounds to auscultation ×4.  No guarding or rigidity.  Extremities: no clubbing, cyanosis or edema.  Good range of motion actively and passively.  Symmetric muscle strength and development.  Ambulates independently.  There is crepitance on A/P ROM of the right knee, mild medial/lateral joint line tenderness additionally.  No appreciable ligamentous laxity noted.  Rob/Marte sign negative.  Normal dorsiflexion/plantarflexion of bilateral feet.  Neuro: normal speech and mental status.  Cranial nerves II through XII intact.  No anosmia. DTR 2+; proprioception intact.  No focal motor/sensory deficits.    Advance Care Planning   ACP discussion was held with the patient during this visit. Patient does not have an advance directive, information provided.       Assessment " and Plan      Assessment/Plan:   Diagnoses and all orders for this visit:    1. Essential hypertension (Primary)  -     Basic Metabolic Panel    2. Hiatal hernia with GERD without esophagitis    3. Obstructive sleep apnea on CPAP    4. BMI 27.0-27.9,adult    5. Vitamin D deficiency  -     vitamin D (ERGOCALCIFEROL) 1.25 MG (97116 UT) capsule capsule; Take 1 capsule by mouth 1 (One) Time Per Week  Dispense: 8 capsule; Refill: 3      VSS; BP @ goal.  Continue current dose of losartan.    Continue PPI.  Continue current dose of pantoprazole.  Continue dietary/lifestyle modifications to aid in symptom management of his chronic GERD.    Continue to follow clinically after d/c of CPAP; wt loss efforts have certainly benefited him therapeutically.    Surveillance BMP today.    Continue vitamin D supplementation.    Discussion Summary:    Discussed plan of care in detail with pt today; pt verb understanding and agrees.    I spent 30 minutes caring for Robbin on this date of service. This time includes time spent by me in the following activities:preparing for the visit, performing a medically appropriate examination and/or evaluation , counseling and educating the patient/family/caregiver, ordering medications, tests, or procedures, documenting information in the medical record and care coordination    I have reviewed and updated all copied forward information, as appropriate.  I attest to the accuracy and relevance of any unchanged information.    Follow up:  Return in about 6 months (around 2/18/2023) for Annual physical, Med Change/New Meds.     There are no Patient Instructions on file for this visit.    Rodney Smith DO  08/18/22  16:01 EDT          Please note that portions of this note may have been completed with a voice recognition program. Efforts were made to edit the dictations, but occasionally words are mistranscribed.

## 2023-01-26 ENCOUNTER — OFFICE VISIT (OUTPATIENT)
Dept: FAMILY MEDICINE CLINIC | Facility: CLINIC | Age: 43
End: 2023-01-26
Payer: COMMERCIAL

## 2023-01-26 VITALS
SYSTOLIC BLOOD PRESSURE: 130 MMHG | OXYGEN SATURATION: 99 % | HEART RATE: 88 BPM | WEIGHT: 196.6 LBS | DIASTOLIC BLOOD PRESSURE: 72 MMHG | HEIGHT: 68 IN | TEMPERATURE: 98 F | RESPIRATION RATE: 15 BRPM | BODY MASS INDEX: 29.8 KG/M2

## 2023-01-26 DIAGNOSIS — R00.2 PALPITATIONS: ICD-10-CM

## 2023-01-26 DIAGNOSIS — G47.33 OBSTRUCTIVE SLEEP APNEA ON CPAP: Primary | ICD-10-CM

## 2023-01-26 DIAGNOSIS — R53.81 MALAISE AND FATIGUE: ICD-10-CM

## 2023-01-26 DIAGNOSIS — R53.83 MALAISE AND FATIGUE: ICD-10-CM

## 2023-01-26 DIAGNOSIS — Z99.89 OBSTRUCTIVE SLEEP APNEA ON CPAP: Primary | ICD-10-CM

## 2023-01-26 DIAGNOSIS — I10 ESSENTIAL HYPERTENSION: ICD-10-CM

## 2023-01-26 PROCEDURE — 99214 OFFICE O/P EST MOD 30 MIN: CPT | Performed by: FAMILY MEDICINE

## 2023-01-26 PROCEDURE — 93000 ELECTROCARDIOGRAM COMPLETE: CPT | Performed by: FAMILY MEDICINE

## 2023-01-26 RX ORDER — NEBIVOLOL 10 MG/1
10 TABLET ORAL DAILY
Qty: 90 TABLET | Refills: 1 | Status: SHIPPED | OUTPATIENT
Start: 2023-01-26

## 2023-02-06 LAB
ALBUMIN SERPL-MCNC: 4.9 G/DL (ref 3.5–5.2)
ALBUMIN/GLOB SERPL: 2.1 G/DL
ALP SERPL-CCNC: 63 U/L (ref 39–117)
ALT SERPL-CCNC: 23 U/L (ref 1–41)
AST SERPL-CCNC: 21 U/L (ref 1–40)
BASOPHILS # BLD AUTO: 0.03 10*3/MM3 (ref 0–0.2)
BASOPHILS NFR BLD AUTO: 0.8 % (ref 0–1.5)
BILIRUB SERPL-MCNC: 0.3 MG/DL (ref 0–1.2)
BUN SERPL-MCNC: 19 MG/DL (ref 6–20)
BUN/CREAT SERPL: 27.1 (ref 7–25)
CALCIUM SERPL-MCNC: 9.5 MG/DL (ref 8.6–10.5)
CHLORIDE SERPL-SCNC: 99 MMOL/L (ref 98–107)
CO2 SERPL-SCNC: 30.5 MMOL/L (ref 22–29)
CREAT SERPL-MCNC: 0.7 MG/DL (ref 0.76–1.27)
EGFRCR SERPLBLD CKD-EPI 2021: 119.5 ML/MIN/1.73
EOSINOPHIL # BLD AUTO: 0.05 10*3/MM3 (ref 0–0.4)
EOSINOPHIL NFR BLD AUTO: 1.3 % (ref 0.3–6.2)
ERYTHROCYTE [DISTWIDTH] IN BLOOD BY AUTOMATED COUNT: 11.9 % (ref 12.3–15.4)
GLOBULIN SER CALC-MCNC: 2.3 GM/DL
GLUCOSE SERPL-MCNC: 107 MG/DL (ref 65–99)
HCT VFR BLD AUTO: 42.9 % (ref 37.5–51)
HGB BLD-MCNC: 14.2 G/DL (ref 13–17.7)
IMM GRANULOCYTES # BLD AUTO: 0.01 10*3/MM3 (ref 0–0.05)
IMM GRANULOCYTES NFR BLD AUTO: 0.3 % (ref 0–0.5)
LYMPHOCYTES # BLD AUTO: 0.97 10*3/MM3 (ref 0.7–3.1)
LYMPHOCYTES NFR BLD AUTO: 25.1 % (ref 19.6–45.3)
MAGNESIUM SERPL-MCNC: 2.1 MG/DL (ref 1.6–2.6)
MCH RBC QN AUTO: 30.5 PG (ref 26.6–33)
MCHC RBC AUTO-ENTMCNC: 33.1 G/DL (ref 31.5–35.7)
MCV RBC AUTO: 92.3 FL (ref 79–97)
MONOCYTES # BLD AUTO: 0.3 10*3/MM3 (ref 0.1–0.9)
MONOCYTES NFR BLD AUTO: 7.8 % (ref 5–12)
NEUTROPHILS # BLD AUTO: 2.5 10*3/MM3 (ref 1.7–7)
NEUTROPHILS NFR BLD AUTO: 64.7 % (ref 42.7–76)
NRBC BLD AUTO-RTO: 0 /100 WBC (ref 0–0.2)
PLATELET # BLD AUTO: 233 10*3/MM3 (ref 140–450)
POTASSIUM SERPL-SCNC: 4.8 MMOL/L (ref 3.5–5.2)
PROT SERPL-MCNC: 7.2 G/DL (ref 6–8.5)
RBC # BLD AUTO: 4.65 10*6/MM3 (ref 4.14–5.8)
SODIUM SERPL-SCNC: 138 MMOL/L (ref 136–145)
T4 FREE SERPL-MCNC: 1.12 NG/DL (ref 0.93–1.7)
TESTOST FREE SERPL-MCNC: <0.2 PG/ML (ref 6.8–21.5)
TESTOST SERPL-MCNC: 250.8 NG/DL (ref 264–916)
TSH SERPL DL<=0.005 MIU/L-ACNC: 1.85 UIU/ML (ref 0.27–4.2)
WBC # BLD AUTO: 3.86 10*3/MM3 (ref 3.4–10.8)

## 2023-03-01 DIAGNOSIS — E29.1 HYPOGONADISM IN MALE: Primary | ICD-10-CM

## 2023-03-09 ENCOUNTER — OFFICE VISIT (OUTPATIENT)
Dept: FAMILY MEDICINE CLINIC | Facility: CLINIC | Age: 43
End: 2023-03-09
Payer: COMMERCIAL

## 2023-03-09 VITALS
OXYGEN SATURATION: 98 % | HEIGHT: 68 IN | RESPIRATION RATE: 16 BRPM | WEIGHT: 195.2 LBS | HEART RATE: 80 BPM | BODY MASS INDEX: 29.58 KG/M2 | TEMPERATURE: 97.8 F | DIASTOLIC BLOOD PRESSURE: 84 MMHG | SYSTOLIC BLOOD PRESSURE: 132 MMHG

## 2023-03-09 DIAGNOSIS — G47.01 INSOMNIA DUE TO MEDICAL CONDITION: ICD-10-CM

## 2023-03-09 DIAGNOSIS — Z99.89 OBSTRUCTIVE SLEEP APNEA ON CPAP: Primary | ICD-10-CM

## 2023-03-09 DIAGNOSIS — I10 ESSENTIAL HYPERTENSION: ICD-10-CM

## 2023-03-09 DIAGNOSIS — G47.33 OBSTRUCTIVE SLEEP APNEA ON CPAP: Primary | ICD-10-CM

## 2023-03-09 DIAGNOSIS — E29.1 HYPOGONADISM IN MALE: ICD-10-CM

## 2023-03-09 PROCEDURE — 99214 OFFICE O/P EST MOD 30 MIN: CPT | Performed by: FAMILY MEDICINE

## 2023-03-09 RX ORDER — TRAZODONE HYDROCHLORIDE 50 MG/1
50 TABLET ORAL NIGHTLY
Qty: 90 TABLET | Refills: 2 | Status: SHIPPED | OUTPATIENT
Start: 2023-03-09

## 2023-03-09 NOTE — PROGRESS NOTES
Established Patient        Chief Complaint:   Chief Complaint   Patient presents with   • Insomnia     Follow up         Robbin Arteaga is a 42 y.o. male    History of Present Illness:   Here today in scheduled follow-up visit of his obstructive sleep apnea, hypertension, insomnia and low testosterone.    Patient has an appointment to be seen by urology concerning his symptomatic low testosterone.  Most recent results were profoundly abnormal.    He denies any chest pain, syncope, palpitations or vertigo.  States his blood pressures been well controlled on routine evaluations at home.  He has made efforts to be more compliant with his CPAP use and treatment of his obstructive sleep apnea.  He does report significant restlessness throughout the night utilizing the facemask.    Subjective     The following portions of the patient's history were reviewed and updated as appropriate: allergies, current medications, past family history, past medical history, past social history, past surgical history and problem list.    Allergies   Allergen Reactions   • Penicillins Other (See Comments)     Pt states it happened when he was a kid and he is unsure of the reaction        Review of Systems  1. Constitutional: Negative for fever. Negative for chills, diaphoresis; malaise/fatigue, without unexpected weight change.  2. HENT: No dysphagia; no changes to vision/hearing/smell/taste; no epistaxis  3. Eyes: Negative for redness and visual disturbance.   4. Respiratory: negative for shortness of breath. Negative for chest pain . Negative for cough and chest tightness.   5. Cardiovascular: Negative for chest pain and palpitations.   6. Gastrointestinal: Negative for abdominal distention, abdominal pain and blood in stool.   7. Endocrine: Negative for cold intolerance and heat intolerance.  Decreased libido.  8. Genitourinary: Negative for difficulty urinating, dysuria and frequency.   9. Musculoskeletal:  "Negative for arthralgias, back pain and myalgias.   10. Skin: Negative for color change, rash and wound.   11. Neurological: Negative for syncope, weakness and headaches.   12. Hematological: Negative for adenopathy. Does not bruise/bleed easily.   13. Psychiatric/Behavioral: Negative for confusion. The patient is not nervous/anxious.  Sleep difficulty as per above.    Objective     Physical Exam   Vital Signs: /84   Pulse 80   Temp 97.8 °F (36.6 °C)   Resp 16   Ht 172.7 cm (68\")   Wt 88.5 kg (195 lb 3.2 oz)   SpO2 98%   BMI 29.68 kg/m²   BMI is >= 25 and <30. (Overweight) The following options were offered after discussion;: exercise counseling/recommendations and nutrition counseling/recommendations    General Appearance: alert, oriented x 3, no acute distress.  Skin: warm and dry.   HEENT: Atraumatic.  pupils round and reactive to light and accommodation, oral mucosa pink and moist.  Nares patent without epistaxis.  External auditory canals are patent tympanic membranes intact.  Neck: supple, no JVD, trachea midline.  No thyromegaly  Lungs: CTA, unlabored breathing effort.  Heart: RRR, normal S1 and S2, no S3, no rub.  Abdomen: soft, non-tender, no palpable bladder, present bowel sounds to auscultation ×4.  No guarding or rigidity.  Extremities: no clubbing, cyanosis or edema.  Good range of motion actively and passively.  Symmetric muscle strength and development  Neuro: normal speech and mental status.  Cranial nerves II through XII intact.  No anosmia. DTR 2+; proprioception intact.  No focal motor/sensory deficits.    Advance Care Planning   ACP discussion was held with the patient during this visit. Patient does not have an advance directive, information provided.       Assessment and Plan      Assessment/Plan:   Diagnoses and all orders for this visit:    1. Obstructive sleep apnea on CPAP (Primary)    2. Insomnia due to medical condition  -     traZODone (DESYREL) 50 MG tablet; Take 1 tablet " by mouth Every Night.  Dispense: 90 tablet; Refill: 2    3. Hypogonadism in male      Keep appt to see urology later this month.  Does not carry any truncal obesity.  Utilizes a vigorous exercise regimen.  Symptomatic in numerous aspects of his daily living.    VSS, appears HD asymptomatic.    Trial of Trazodone; will follow clinically.  I have asked that he notify the office should develop any ill effects to the new medication        Discussion Summary:    Discussed plan of care in detail with pt today; pt verb understanding and agrees.    I spent 30 minutes caring for Robbin on this date of service. This time includes time spent by me in the following activities:preparing for the visit, performing a medically appropriate examination and/or evaluation , counseling and educating the patient/family/caregiver, ordering medications, tests, or procedures, documenting information in the medical record and care coordination    I have reviewed and updated all copied forward information, as appropriate.  I attest to the accuracy and relevance of any unchanged information.    Follow up:  No follow-ups on file.     There are no Patient Instructions on file for this visit.    Rodney Smith DO  03/09/23  09:15 EST

## 2023-03-27 ENCOUNTER — LAB (OUTPATIENT)
Dept: LAB | Facility: HOSPITAL | Age: 43
End: 2023-03-27
Payer: COMMERCIAL

## 2023-03-27 ENCOUNTER — OFFICE VISIT (OUTPATIENT)
Dept: UROLOGY | Facility: CLINIC | Age: 43
End: 2023-03-27
Payer: COMMERCIAL

## 2023-03-27 ENCOUNTER — PATIENT ROUNDING (BHMG ONLY) (OUTPATIENT)
Dept: UROLOGY | Facility: CLINIC | Age: 43
End: 2023-03-27
Payer: COMMERCIAL

## 2023-03-27 VITALS
DIASTOLIC BLOOD PRESSURE: 84 MMHG | SYSTOLIC BLOOD PRESSURE: 122 MMHG | HEIGHT: 68 IN | BODY MASS INDEX: 29.58 KG/M2 | WEIGHT: 195.2 LBS

## 2023-03-27 DIAGNOSIS — E29.1 HYPOGONADISM IN MALE: Primary | ICD-10-CM

## 2023-03-27 LAB
ESTRADIOL SERPL HS-MCNC: 18.1 PG/ML
LH SERPL-ACNC: 2.36 MIU/ML
PROLACTIN SERPL-MCNC: 6.86 NG/ML (ref 4.04–15.2)
PSA SERPL-MCNC: 0.38 NG/ML (ref 0–4)

## 2023-03-27 PROCEDURE — 84146 ASSAY OF PROLACTIN: CPT | Performed by: NURSE PRACTITIONER

## 2023-03-27 PROCEDURE — 36415 COLL VENOUS BLD VENIPUNCTURE: CPT | Performed by: NURSE PRACTITIONER

## 2023-03-27 PROCEDURE — 83002 ASSAY OF GONADOTROPIN (LH): CPT | Performed by: NURSE PRACTITIONER

## 2023-03-27 PROCEDURE — 82670 ASSAY OF TOTAL ESTRADIOL: CPT | Performed by: NURSE PRACTITIONER

## 2023-03-27 PROCEDURE — 84153 ASSAY OF PSA TOTAL: CPT | Performed by: NURSE PRACTITIONER

## 2023-03-27 PROCEDURE — 99214 OFFICE O/P EST MOD 30 MIN: CPT | Performed by: NURSE PRACTITIONER

## 2023-03-27 RX ORDER — TESTOSTERONE UNDECANOATE 112.5 MG/1
2 CAPSULE, LIQUID FILLED ORAL 2 TIMES DAILY
Qty: 120 CAPSULE | Refills: 0 | Status: SHIPPED | OUTPATIENT
Start: 2023-03-27

## 2023-03-27 NOTE — PROGRESS NOTES
Office Visit Low Testosterone      Patient Name: Robbin Atreaga  : 1980   MRN: [id number]     Chief Complaint:   Chief Complaint   Patient presents with   • Hypogonadism       Referring Provider: Rodney Smith DO    History of Present Illness: Robbin Arteaga is a 42 y.o. male who presents today with low testosterone.  The serum test was collected due to the following complaints: Decreased libido   he had low T that would fluctuate, he took injections, androgel and fortesta was causing him to have anxiety  All of his testosterone issues started after patient started Suboxone    Low libido   yes  Low energy   no  Poor sleep   no  Mental clarity issues  yes    History of depression? no  Erectile dysfunction?  yes    Any potential interest in conception?  no    Objective      Review of System:   As noted in HPI    Past Medical History:   Past Medical History:   Diagnosis Date   • Hiatal hernia    • Hypertension        Past Surgical History:   Past Surgical History:   Procedure Laterality Date   • LEG SURGERY         Family History:   Family History   Problem Relation Age of Onset   • Cancer Father        Social History:   Social History     Socioeconomic History   • Marital status:    Tobacco Use   • Smoking status: Former     Packs/day: 0.50     Years: 5.00     Pack years: 2.50     Types: Cigarettes     Quit date:      Years since quitting: 15.2     Passive exposure: Past   • Smokeless tobacco: Never   Vaping Use   • Vaping Use: Never used   Substance and Sexual Activity   • Alcohol use: No   • Drug use: No   • Sexual activity: Yes     Partners: Female       Medications:     Current Outpatient Medications:   •  nebivolol (BYSTOLIC) 10 MG tablet, Take 1 tablet by mouth Daily., Disp: 90 tablet, Rfl: 1  •  SUBOXONE 8-2 MG film film, take 2 FILMs under the tongue once daily, Disp: , Rfl: 0  •  Testosterone Undecanoate (Tlando) 112.5 MG capsule, Take 2 capsules by mouth 2 (Two)  "Times a Day. With Meals, Disp: 120 capsule, Rfl: 0  •  traZODone (DESYREL) 50 MG tablet, Take 1 tablet by mouth Every Night., Disp: 90 tablet, Rfl: 2  •  vitamin D (ERGOCALCIFEROL) 1.25 MG (07441 UT) capsule capsule, Take 1 capsule by mouth 1 (One) Time Per Week, Disp: 8 capsule, Rfl: 3    Allergies:   Allergies   Allergen Reactions   • Penicillins Other (See Comments)     Pt states it happened when he was a kid and he is unsure of the reaction        Objective     Physical Exam:   Vital Signs:   Vitals:    03/27/23 0914   BP: 122/84   BP Location: Left arm   Patient Position: Sitting   Cuff Size: Adult   Weight: 88.5 kg (195 lb 3.2 oz)   Height: 172.7 cm (67.99\")     Body mass index is 29.69 kg/m².     Constitutional: NAD, WDWN.   Neurological: A + O x 3    Psych: normal mood and affect    Labs  Lab Results   Component Value Date    TESTOSTERONE 250.8 (L) 01/26/2023       No results found for: PSA    Lab Results   Component Value Date    WBC 3.86 01/26/2023    HGB 14.2 01/26/2023    HCT 42.9 01/26/2023    MCV 92.3 01/26/2023     01/26/2023       Assessment / Plan    Assessment  Mr. Arteaga is a 42 y.o. male with low testosterone.  Today we discussed the role testosterone plays for a male patient. I have indicated that low testosterone can be associated with metabolic syndrome, erectile dysfunction, coronary artery disease, diabetes, depression, osteoporosis, fatigue, low sex drive, poor sleep, high cholesterol, increased abdominal fat, muscle loss, irritability, hot flashes, and inability to concentrate.     Treatment options were discussed including SERMs, aromatase inhibitors, topical gel or patch, oral troches, nasal spray, testosterone injections every 2-3 weeks, long-acting injections every 10 weeks, and testosterone pellets placed in clinic every 4-6 months.    I explained the risks, benefits, and alternatives to testosterone therapies. I informed him of the potential SEs of chest and leg swelling, " increased acne, change in mood, polycythemia, and worsening of undiagnosed prostate cancer.     Patient will report to me if he develops any symptoms of anxiety related to taking testosterone.  We discussed if he will be getting off his Suboxone as this does affect his testosterone and he feels this is the reason his testosterone levels initially started to decline.  At this time he is trying to taper down his dosage but is unsure if he will be stopping the medication.  Plan  1.  He wishes to proceed with a trial of Tlando     Follow Up:   Return in about 3 months (around 6/27/2023) for Follow up Amber.    DEBBI Cobian, NP-C  [facility]

## 2023-03-30 ENCOUNTER — TELEPHONE (OUTPATIENT)
Dept: UROLOGY | Facility: CLINIC | Age: 43
End: 2023-03-30

## 2023-03-30 NOTE — TELEPHONE ENCOUNTER
Provider: ANALIA AMARO  Caller: MAKEDA NAVARRO  Relationship to Patient: SELF  Pharmacy: [facility]  Phone Number: [phone]  Reason for Call: PT CALLED TO CHECK ON TESTOSTERONE PRESCRIPTION.  PLEASE CALL PT TO CONFIRM THIS HAS BEEN SENT TO  PHARMACY.    OK TO [facility].

## 2023-03-31 NOTE — PROGRESS NOTES
March 31, 2023    Hello, may I speak with Robbin Arteaga? Unable to reach patient.    My name is Luzmaria    I am  with [facility]  [address]  [address]  [facility] [address] [phone].    Before we get started may I verify your date of birth? 1980    I am calling to officially welcome you to our practice and ask about your recent visit. Is this a good time to talk?     Tell me about your visit with us. What things went well?         We're always looking for ways to make our patients' experiences even better. Do you have recommendations on ways we may improve?     Overall were you satisfied with your first visit to our practice?       I appreciate you taking the time to speak with me today. Is there anything else I can do for you?       Thank you, and have a great day.

## 2023-05-01 ENCOUNTER — PROCEDURE VISIT (OUTPATIENT)
Dept: UROLOGY | Facility: CLINIC | Age: 43
End: 2023-05-01
Payer: COMMERCIAL

## 2023-05-01 VITALS
HEIGHT: 67 IN | SYSTOLIC BLOOD PRESSURE: 136 MMHG | HEART RATE: 61 BPM | BODY MASS INDEX: 30.23 KG/M2 | WEIGHT: 192.6 LBS | TEMPERATURE: 97.5 F | RESPIRATION RATE: 12 BRPM | DIASTOLIC BLOOD PRESSURE: 90 MMHG | OXYGEN SATURATION: 98 %

## 2023-05-01 DIAGNOSIS — Z30.09 VASECTOMY EVALUATION: Primary | ICD-10-CM

## 2023-05-01 PROCEDURE — 99213 OFFICE O/P EST LOW 20 MIN: CPT | Performed by: UROLOGY

## 2023-05-01 NOTE — PROGRESS NOTES
Chief Complaint  Elective sterilization    Referring provider  No ref. provider found    HPI  Mr. Arteaga is a 42 y.o. male who presents with desire for irreversible, elective sterilization.    He has 3 kids.    His significant other is supportive of this decision.      Past Medical History  Past Medical History:   Diagnosis Date   • Hiatal hernia    • Hormone disorder 2011   • Hypertension    • STD (sexually transmitted disease) 2003       Past Surgical History  Past Surgical History:   Procedure Laterality Date   • LEG SURGERY         Medications    Current Outpatient Medications:   •  cetirizine (zyrTEC) 10 MG tablet, Take 1 tablet by mouth Daily., Disp: 30 tablet, Rfl: 0  •  fluticasone (FLONASE) 50 MCG/ACT nasal spray, Administer 1-2 sprays each into nostril daily, Disp: 16 g, Rfl: 0  •  nebivolol (BYSTOLIC) 10 MG tablet, Take 1 tablet by mouth Daily., Disp: 90 tablet, Rfl: 1  •  ondansetron ODT (ZOFRAN-ODT) 4 MG disintegrating tablet, Place 1 tablet under the tongue Every 8 (Eight) Hours As Needed for Nausea or Vomiting., Disp: 15 tablet, Rfl: 0  •  polyethylene glycol (MIRALAX) 17 GM/SCOOP powder, Dissolve 17 g (1 capful) in a beverage of your choice and drink by mouth daily as needed for constipaton, Disp: 510 g, Rfl: 0  •  predniSONE (DELTASONE) 20 MG tablet, Take 3 tablets by mouth daily for 2 days, then 2 tablets by mouth daily for 2 days, then 1 tablet by mouth daily for 2 days, Disp: 12 tablet, Rfl: 0  •  SUBOXONE 8-2 MG film film, take 2 FILMs under the tongue once daily, Disp: , Rfl: 0  •  Testosterone Undecanoate (Tlando) 112.5 MG capsule, Take 2 capsules by mouth 2 (Two) Times a Day With Meals, Disp: 120 capsule, Rfl: 0  •  traZODone (DESYREL) 50 MG tablet, Take 1 tablet by mouth Every Night., Disp: 90 tablet, Rfl: 2  •  vitamin D (ERGOCALCIFEROL) 1.25 MG (45970 UT) capsule capsule, Take 1 capsule by mouth 1 (One) Time Per Week, Disp: 8 capsule, Rfl: 3    Allergies  Allergies   Allergen Reactions   •  "Penicillins Other (See Comments)     Pt states it happened when he was a kid and he is unsure of the reaction        Social History  Social History     Socioeconomic History   • Marital status:    Tobacco Use   • Smoking status: Former     Packs/day: 0.50     Years: 5.00     Pack years: 2.50     Types: Cigarettes     Quit date: 1/1/2008     Years since quitting: 15.3     Passive exposure: Past   • Smokeless tobacco: Never   Vaping Use   • Vaping Use: Never used   Substance and Sexual Activity   • Alcohol use: No   • Drug use: No   • Sexual activity: Yes     Partners: Female       Family History  He has no family history of prostate cancer    Review of Systems  Constitutional: Negative for fever, chills, weight loss, weight gain and malaise/fatigue.   Skin: Negative for rash.   Eyes: Negative for blurred vision.   Endocrine: No heat/cold intolerance   Cardiovascular: Negative for chest pain or dyspnea on exertion.  Respiratory: Negative for shortness of breath or wheezing.   Gastrointestinal: Negative for nausea, abdominal pain, diarrhea, constipation.   Genitourinary: Negative for new lower urinary tract symptoms, current gross hematuria or dysuria.  Musculoskeletal: Negative for back pain and joint pain.   Lymph/Heme: Negative for easily bruises / bleeds.     Physical Exam  Visit Vitals  /90 (BP Location: Left arm, Patient Position: Sitting, Cuff Size: Adult)   Pulse 61   Temp 97.5 °F (36.4 °C) (Temporal)   Resp 12   Ht 171 cm (67.32\")   Wt 87.4 kg (192 lb 9.6 oz)   SpO2 98%   BMI 29.88 kg/m²     Constitutional: NAD, WDWN.   HEENT: NCAT. Conjunctivae normal.  MMM.    Cardiovascular: Regular rate.  Pulmonary/Chest: Respirations are even and non-labored bilaterally.  Abdominal: Soft. No distension, tenderness, masses or guarding. No CVA tenderness.  Neurological: A + O x 3.  Cranial Nerves II-XII grossly intact. Normal gait.  Extremities: ALEX x 4, Warm. No clubbing.  No cyanosis.    Skin: Pink, warm and " dry.  No rashes noted.  Psychiatric:  Normal mood and affect  Genitourinary  Penis: Meatus and glans normal, no penile discharge.  No rashes/lesions.    Testes: descended bilaterally, no masses, nontender to palpation. Remainder of scrotal contents normal. No hernia appreciated.  Bilateral vasa palpable    Assessment and Plan  Mr. Arteaga is a 42 y.o. male who presents today requesting permanent, irreversible surgical sterilization.  He was instructed to trim his pubic hair the night prior with a clippers.  The vasectomy was discussed in detail with the patient today including the risks which are failure of the procedure, infection, bleeding, development of chronic testicular pain and the possibility of injuring adjacent structures which could potentially result in loss of the testicle.  Furthermore, the patient was told he would remain fertile following the procedure until he provided a semen analysis that showed no sperm.  The patient expressed understanding and desire to proceed.      He will be scheduled for vasectomy with nitrous at his convenience.     I spent a total of 30 minutes in total patient care, which involved direct interaction, examination, chart review, and discussion of treatment options.

## 2023-05-24 DIAGNOSIS — E55.9 VITAMIN D DEFICIENCY: ICD-10-CM

## 2023-05-24 RX ORDER — ERGOCALCIFEROL 1.25 MG/1
50000 CAPSULE ORAL WEEKLY
Qty: 8 CAPSULE | Refills: 3 | Status: SHIPPED | OUTPATIENT
Start: 2023-05-24

## 2023-06-14 DIAGNOSIS — E29.1 HYPOGONADISM IN MALE: ICD-10-CM

## 2023-06-19 RX ORDER — TESTOSTERONE UNDECANOATE 112.5 MG/1
2 CAPSULE, LIQUID FILLED ORAL 2 TIMES DAILY
Qty: 120 CAPSULE | Refills: 0 | Status: SHIPPED | OUTPATIENT
Start: 2023-06-19

## 2023-08-02 DIAGNOSIS — E29.1 HYPOGONADISM IN MALE: ICD-10-CM

## 2023-08-02 RX ORDER — TESTOSTERONE UNDECANOATE 112.5 MG/1
2 CAPSULE, LIQUID FILLED ORAL 2 TIMES DAILY
Qty: 120 CAPSULE | Refills: 0 | Status: SHIPPED | OUTPATIENT
Start: 2023-08-02

## 2023-08-25 ENCOUNTER — TELEPHONE (OUTPATIENT)
Dept: UROLOGY | Facility: CLINIC | Age: 43
End: 2023-08-25

## 2023-08-25 NOTE — TELEPHONE ENCOUNTER
Caller: VANESSA NAVARRO    Relationship: SELF     Best call back number: [phone]    Patient is needing: PT NEEDS TO RESCHEDULE HIS MYCHART VIDEO VISIT.

## 2023-09-11 ENCOUNTER — LAB (OUTPATIENT)
Dept: LAB | Facility: HOSPITAL | Age: 43
End: 2023-09-11
Payer: COMMERCIAL

## 2023-09-11 DIAGNOSIS — Z98.52 STATUS POST VASECTOMY: Primary | ICD-10-CM

## 2023-09-11 LAB — SPERM - POST VASECTOMY: NORMAL

## 2023-09-11 PROCEDURE — 84403 ASSAY OF TOTAL TESTOSTERONE: CPT | Performed by: UROLOGY

## 2023-09-11 PROCEDURE — 84402 ASSAY OF FREE TESTOSTERONE: CPT | Performed by: UROLOGY

## 2023-09-11 PROCEDURE — 85018 HEMOGLOBIN: CPT | Performed by: UROLOGY

## 2023-09-11 PROCEDURE — 89321 SEMEN ANAL SPERM DETECTION: CPT

## 2023-09-11 PROCEDURE — 85014 HEMATOCRIT: CPT | Performed by: UROLOGY

## 2023-09-21 ENCOUNTER — TELEMEDICINE (OUTPATIENT)
Dept: UROLOGY | Facility: CLINIC | Age: 43
End: 2023-09-21
Payer: COMMERCIAL

## 2023-09-21 DIAGNOSIS — E29.1 HYPOGONADISM IN MALE: Primary | ICD-10-CM

## 2023-09-21 RX ORDER — BLOOD PRESSURE TEST KIT
KIT MISCELLANEOUS
Qty: 100 EACH | Refills: 11 | Status: SHIPPED | OUTPATIENT
Start: 2023-09-21

## 2023-09-21 RX ORDER — TESTOSTERONE CYPIONATE 100 MG/ML
100 INJECTION, SOLUTION INTRAMUSCULAR WEEKLY
Qty: 10 ML | Refills: 1 | Status: SHIPPED | OUTPATIENT
Start: 2023-09-21

## 2023-09-21 NOTE — PROGRESS NOTES
CC  hypogonadism    HPI  Mr. Arteaga is a 42 y.o. male with history below in assessment, who presents for follow up.     At this visit on Tlando but no longer has energy    Past Medical History:   Diagnosis Date    Hiatal hernia     Hormone disorder 2011    Hypertension     STD (sexually transmitted disease) 2003       Past Surgical History:   Procedure Laterality Date    LEG SURGERY           Current Outpatient Medications:     nebivolol (BYSTOLIC) 10 MG tablet, Take 1 tablet by mouth Daily., Disp: 90 tablet, Rfl: 1    SUBOXONE 8-2 MG film film, take 2 FILMs under the tongue once daily, Disp: , Rfl: 0    Testosterone Undecanoate (Tlando) 112.5 MG capsule, Take 2 capsules by mouth 2 (Two) Times a Day With Meals, Disp: 120 capsule, Rfl: 0    Vitamin D, Ergocalciferol, 76257 units capsule, Take 1 capsule by mouth 1 (One) Time Per Week., Disp: 8 capsule, Rfl: 3     Physical Exam  There were no vitals taken for this visit.    Labs  Brief Urine Lab Results       None            Lab Results   Component Value Date    GLUCOSE 107 (H) 01/26/2023    CALCIUM 9.5 01/26/2023     01/26/2023    K 4.8 01/26/2023    CO2 30.5 (H) 01/26/2023    CL 99 01/26/2023    BUN 19 01/26/2023    CREATININE 0.70 (L) 01/26/2023    EGFRIFAFRI 147 01/11/2022    EGFRIFNONA 122 01/11/2022    BCR 27.1 (H) 01/26/2023    ANIONGAP 13.1 07/23/2018       Lab Results   Component Value Date    WBC 3.86 01/26/2023    HGB 14.2 09/11/2023    HCT 42.3 09/11/2023    MCV 92.3 01/26/2023     01/26/2023        Lab Results   Component Value Date    PSA 0.383 03/27/2023       Lab Results   Component Value Date    TESTFRE 5.5 (L) 09/11/2023    TESTOSTEROTT 150 (L) 09/11/2023        Radiographic Studies  No Images in the past 120 days found..      I have reviewed above labs and imaging.     Assessment  42 y.o. male with chronic hypogonadism, without good effect on oral testosterone / Tlando    Plan  1.  Switch to 100mg TC weekly   2.  Follow-up in 3 months  with total and free testosterone, hematocrit    This was an audio and video enabled telemedicine encounter.

## 2023-09-28 ENCOUNTER — TELEPHONE (OUTPATIENT)
Dept: UROLOGY | Facility: CLINIC | Age: 43
End: 2023-09-28
Payer: COMMERCIAL

## 2023-09-28 NOTE — TELEPHONE ENCOUNTER
Hub staff attempted to follow warm transfer process and was unsuccessful     Caller: DAREN GONZALEZ    Relationship to patient: SELF    Best call back number: [phone] (home)      Patient is needing: PAT HAS SCRIPT FOR TESTOSTERONE AND PHARMACY STATES THIS REQUIRES A PRIOR AUTH. PLEASE CALL PAT WHEN THIS IS FINISHED SO HE CAN  MEDS.

## 2023-10-02 NOTE — TELEPHONE ENCOUNTER
Hub staff attempted to follow warm transfer process and was unsuccessful     Caller: DAREN NAVARRO    Relationship to patient: SELF    Best call back number: [phone]    Patient is needing: PATIENT IS WAITING ON OFFICE TO HERE BACK REGARDING THE PA FOR HIS TESTOSTERONE, PLEASE CALL PATIENT BACK TO DISCUSS. THANK YOU        DELETE AFTER READING TO PATIENT: “I was unable to reach my scheduling contact. I will send a message to the scheduling team. Please allow 48 hours for the  staff to follow up on this request.”

## 2024-01-09 DIAGNOSIS — I10 ESSENTIAL HYPERTENSION: ICD-10-CM

## 2024-01-09 DIAGNOSIS — E55.9 VITAMIN D DEFICIENCY: ICD-10-CM

## 2024-01-09 RX ORDER — ERGOCALCIFEROL 1.25 MG/1
50000 CAPSULE ORAL WEEKLY
Qty: 8 CAPSULE | Refills: 3 | OUTPATIENT
Start: 2024-01-09

## 2024-01-09 RX ORDER — NEBIVOLOL 10 MG/1
10 TABLET ORAL DAILY
Qty: 90 TABLET | Refills: 1 | OUTPATIENT
Start: 2024-01-09

## 2024-01-09 NOTE — TELEPHONE ENCOUNTER
Caller: Robbin Arteaga Sky    Relationship: Self    Best call back number: [phone]    Requested Prescriptions:   Requested Prescriptions     Pending Prescriptions Disp Refills    Vitamin D, Ergocalciferol, 10367 units capsule 8 capsule 3     Sig: Take 1 capsule by mouth 1 (One) Time Per Week.    nebivolol (BYSTOLIC) 10 MG tablet 90 tablet 1     Sig: Take 1 tablet by mouth Daily.        Pharmacy where request should be sent:    [facility] [phone]  Last office visit with prescribing clinician: 3/9/2023   Last telemedicine visit with prescribing clinician: Visit date not found   Next office visit with prescribing clinician: 2/5/2024     Additional details provided by patient: PATIENT IS OUT OF MEDICATIONS AND WOULD LIKE REFILL UNTIL HIS APPOINTMENT ON 02/05/2024. PLEASE ADVISE    Does the patient have less than a 3 day supply:  [x] Yes  [] No    Would you like a call back once the refill request has been completed: [x] Yes [] No    If the office needs to give you a call back, can they leave a voicemail: [x] Yes [] No    Lyudmila Bess Rep   01/09/24 12:31 EST

## 2024-01-10 DIAGNOSIS — E55.9 VITAMIN D DEFICIENCY: ICD-10-CM

## 2024-01-10 DIAGNOSIS — I10 ESSENTIAL HYPERTENSION: ICD-10-CM

## 2024-01-10 NOTE — TELEPHONE ENCOUNTER
Caller: Robbin Arteaga    Relationship: Self    Best call back number:     Requested Prescriptions:   Requested Prescriptions     Pending Prescriptions Disp Refills    nebivolol (BYSTOLIC) 10 MG tablet 90 tablet 1     Sig: Take 1 tablet by mouth Daily.    Vitamin D, Ergocalciferol, 26413 units capsule 8 capsule 3     Sig: Take 1 capsule by mouth 1 (One) Time Per Week.        Pharmacy where request should be sent: [facility]     Last office visit with prescribing clinician: 3/9/2023   Last telemedicine visit with prescribing clinician: Visit date not found   Next office visit with prescribing clinician: 2/5/2024     Additional details provided by patient:  PATIENT IS OUT OF MEDICATIONS AND WOULD LIKE REFILL UNTIL HIS APPOINTMENT ON 02/05/2024. PLEASE ADVISE     Does the patient have less than a 3 day supply:  [x] Yes  [] No    Would you like a call back once the refill request has been completed: [x] Yes [] No    If the office needs to give you a call back, can they leave a voicemail: [x] Yes [] No    Lyudmila Sanford Rep   01/10/24 15:34 EST

## 2024-01-11 RX ORDER — ERGOCALCIFEROL 1.25 MG/1
50000 CAPSULE ORAL WEEKLY
Qty: 8 CAPSULE | Refills: 3 | Status: SHIPPED | OUTPATIENT
Start: 2024-01-11

## 2024-01-11 RX ORDER — NEBIVOLOL 10 MG/1
10 TABLET ORAL DAILY
Qty: 90 TABLET | Refills: 1 | Status: SHIPPED | OUTPATIENT
Start: 2024-01-11

## 2024-02-05 ENCOUNTER — OFFICE VISIT (OUTPATIENT)
Dept: FAMILY MEDICINE CLINIC | Facility: CLINIC | Age: 44
End: 2024-02-05
Payer: COMMERCIAL

## 2024-02-05 VITALS
TEMPERATURE: 97.8 F | SYSTOLIC BLOOD PRESSURE: 124 MMHG | WEIGHT: 200 LBS | HEART RATE: 80 BPM | BODY MASS INDEX: 31.39 KG/M2 | HEIGHT: 67 IN | OXYGEN SATURATION: 96 % | DIASTOLIC BLOOD PRESSURE: 68 MMHG

## 2024-02-05 DIAGNOSIS — G47.33 OBSTRUCTIVE SLEEP APNEA ON CPAP: ICD-10-CM

## 2024-02-05 DIAGNOSIS — I10 ESSENTIAL HYPERTENSION: Primary | ICD-10-CM

## 2024-02-05 PROCEDURE — 99213 OFFICE O/P EST LOW 20 MIN: CPT | Performed by: FAMILY MEDICINE

## 2024-02-05 RX ORDER — NEBIVOLOL 10 MG/1
10 TABLET ORAL DAILY
Qty: 90 TABLET | Refills: 3 | Status: SHIPPED | OUTPATIENT
Start: 2024-02-05 | End: 2024-02-19 | Stop reason: SDUPTHER

## 2024-02-13 DIAGNOSIS — E29.1 HYPOGONADISM IN MALE: ICD-10-CM

## 2024-02-13 RX ORDER — TESTOSTERONE CYPIONATE 1000 MG/10ML
100 INJECTION, SOLUTION INTRAMUSCULAR WEEKLY
Qty: 10 ML | Refills: 1 | OUTPATIENT
Start: 2024-02-13

## 2024-02-19 DIAGNOSIS — E29.1 HYPOGONADISM IN MALE: ICD-10-CM

## 2024-02-19 RX ORDER — NEBIVOLOL 10 MG/1
10 TABLET ORAL DAILY
Qty: 90 TABLET | Refills: 3 | Status: SHIPPED | OUTPATIENT
Start: 2024-02-19

## 2024-02-19 RX ORDER — TESTOSTERONE CYPIONATE 1000 MG/10ML
100 INJECTION, SOLUTION INTRAMUSCULAR WEEKLY
Qty: 10 ML | Refills: 0 | Status: SHIPPED | OUTPATIENT
Start: 2024-02-19

## 2024-02-20 NOTE — PROGRESS NOTES
Established Patient        Chief Complaint:   Chief Complaint   Patient presents with    Med Refill        Robbin Arteaga is a 43 y.o. male    History of Present Illness:   Here today in scheduled follow-up visit of hypertension and obstructive sleep apnea.    Denies chest pain, syncope, palpitations or vertigo.  Denies fever, chills or night sweats.  Maintains an active lifestyle, balanced dietary intake; reports good hydration habits.  Denies hematuria/dysuria.  Denies any BRB/BTS.  No new rashes.  Denies orthopnea, epistaxis or hemoptysis.    Denies any problems with current treatment regimen.  Continues use of NIPPV for NICOLASA.    Subjective     The following portions of the patient's history were reviewed and updated as appropriate: allergies, current medications, past family history, past medical history, past social history, past surgical history and problem list.    Allergies   Allergen Reactions    Penicillins Other (See Comments)     Pt states it happened when he was a kid and he is unsure of the reaction        Review of Systems  Constitutional: Negative for fever. Negative for chills, diaphoresis, fatigue and unexpected weight change.   HENT: No dysphagia; no changes to vision/hearing/smell/taste; no epistaxis  Eyes: Negative for redness and visual disturbance.   Respiratory: negative for shortness of breath. Negative for chest pain . Negative for cough and chest tightness.   Cardiovascular: Negative for chest pain and palpitations.   Gastrointestinal: Negative for abdominal distention, abdominal pain and blood in stool.   Endocrine: Negative for cold intolerance and heat intolerance.   Genitourinary: Negative for difficulty urinating, dysuria and frequency.   Musculoskeletal: Negative for arthralgias, back pain and myalgias.   Skin: Negative for color change, rash and wound.   Neurological: Negative for syncope, weakness and headaches.   Hematological: Negative for adenopathy.  "Does not bruise/bleed easily.   Psychiatric/Behavioral: Negative for confusion. The patient is not nervous/anxious.    Objective     Physical Exam   Vital Signs: /68   Pulse 80   Temp 97.8 °F (36.6 °C)   Ht 170.2 cm (67\")   Wt 90.7 kg (200 lb)   SpO2 96%   BMI 31.32 kg/m²       Patient's (Body mass index is 31.32 kg/m².) indicates that they are obese (BMI >30) with health related conditions that include obstructive sleep apnea, hypertension, and GERD . Weight is unchanged. BMI is is above average; BMI management plan is completed. We discussed portion control and increasing exercise.      General Appearance: alert, oriented x 3, no acute distress.  Skin: warm and dry.   HEENT: Atraumatic.  pupils round and reactive to light and accommodation, oral mucosa pink and moist.  Nares patent without epistaxis.  External auditory canals are patent tympanic membranes intact.  Neck: supple, no JVD, trachea midline.  No thyromegaly  Lungs: CTA, unlabored breathing effort.  Heart: RRR, normal S1 and S2, no S3, no rub.  Abdomen: soft, non-tender, no palpable bladder, present bowel sounds to auscultation ×4.  No guarding or rigidity.  Extremities: no clubbing, cyanosis or edema.  Good range of motion actively and passively.  Symmetric muscle strength and development  Neuro: normal speech and mental status.  Cranial nerves II through XII intact.  No anosmia. DTR 2+; proprioception intact.  No focal motor/sensory deficits.    Advance Care Planning   ACP discussion was held with the patient during this visit. Patient does not have an advance directive, information provided.       Assessment and Plan      Assessment/Plan:   Diagnoses and all orders for this visit:    1. Essential hypertension (Primary)  -     Discontinue: nebivolol (BYSTOLIC) 10 MG tablet; Take 1 tablet by mouth Daily.  Dispense: 90 tablet; Refill: 3  -     nebivolol (BYSTOLIC) 10 MG tablet; Take 1 tablet by mouth Daily.  Dispense: 90 tablet; Refill: " 3    2. Obstructive sleep apnea on CPAP    Vital signs demonstrate hemodynamic stability.  Blood pressure is at goal.  Continue current dose of nebivolol.    Continue use of CPAP in treatment of NICOLASA.  Continues to realize therapeutic benefit.    Discussed need for reduction in sodium/salt/caffeine intake; improve sleep habits as able; inc formal CV exercise program with goal of vigorous activity most, if not all, days of the week; goal of 150 min of sustained HR CV exercise total per week.          Discussion Summary:    Discussed plan of care in detail with pt today; pt verb understanding and agrees.    I spent 25 minutes caring for Robbin on this date of service. This time includes time spent by me in the following activities:preparing for the visit, performing a medically appropriate examination and/or evaluation , counseling and educating the patient/family/caregiver, ordering medications, tests, or procedures, documenting information in the medical record, and care coordination    I have reviewed and updated all copied forward information, as appropriate.  I attest to the accuracy and relevance of any unchanged information.    Follow up:  Return in about 6 months (around 8/5/2024) for Recheck.     There are no Patient Instructions on file for this visit.    Rodney Smith DO  02/19/24  20:37 EST

## 2024-04-03 ENCOUNTER — LAB (OUTPATIENT)
Dept: LAB | Facility: HOSPITAL | Age: 44
End: 2024-04-03
Payer: COMMERCIAL

## 2024-04-03 PROCEDURE — 85018 HEMOGLOBIN: CPT | Performed by: UROLOGY

## 2024-04-03 PROCEDURE — 84403 ASSAY OF TOTAL TESTOSTERONE: CPT | Performed by: UROLOGY

## 2024-04-03 PROCEDURE — 85014 HEMATOCRIT: CPT | Performed by: UROLOGY

## 2024-04-03 PROCEDURE — 84402 ASSAY OF FREE TESTOSTERONE: CPT | Performed by: UROLOGY

## 2024-04-08 ENCOUNTER — OFFICE VISIT (OUTPATIENT)
Dept: UROLOGY | Facility: CLINIC | Age: 44
End: 2024-04-08
Payer: COMMERCIAL

## 2024-04-08 VITALS — DIASTOLIC BLOOD PRESSURE: 80 MMHG | SYSTOLIC BLOOD PRESSURE: 132 MMHG

## 2024-04-08 DIAGNOSIS — G47.33 OSA (OBSTRUCTIVE SLEEP APNEA): ICD-10-CM

## 2024-04-08 DIAGNOSIS — E29.1 HYPOGONADISM IN MALE: Primary | ICD-10-CM

## 2024-04-08 PROCEDURE — 99214 OFFICE O/P EST MOD 30 MIN: CPT | Performed by: UROLOGY

## 2024-04-08 RX ORDER — TESTOSTERONE ENANTHATE 75 MG/.5ML
75 INJECTION SUBCUTANEOUS WEEKLY
Qty: 10 ML | Refills: 1 | Status: SHIPPED | OUTPATIENT
Start: 2024-04-08

## 2024-04-08 NOTE — PROGRESS NOTES
"CC  hypogonadism    HPI  Mr. Arteaga is a 43 y.o. male with history below in assessment, who presents for follow up.     At this visit continues to say he has very little energy or sex drive, despite having testosterone levels well above 1500.  Admits to struggling with his CPAP for NICOLASA    Past Medical History:   Diagnosis Date    Hiatal hernia     Hormone disorder 2011    Hypertension     STD (sexually transmitted disease) 2003       Past Surgical History:   Procedure Laterality Date    LEG SURGERY           Current Outpatient Medications:     Alcohol Swabs pads, Clean area prior to injection, Disp: 100 each, Rfl: 11    nebivolol (BYSTOLIC) 10 MG tablet, Take 1 tablet by mouth Daily., Disp: 90 tablet, Rfl: 3    Needle, Disp, 18G X 1-1/2\" misc, Use for drawing up the medication, Disp: 50 each, Rfl: 3    Needle, Disp, 23G X 1-1/2\" misc, Use 1 Device 1 (One) Time Per Week., Disp: 30 each, Rfl: 11    SUBOXONE 8-2 MG film film, take 2 FILMs under the tongue once daily, Disp: , Rfl: 0    Syringe, Disposable, 3 ML misc, Use 1 syringe 1 (One) Time Per Week., Disp: 100 each, Rfl: 11    testosterone cypionate (DEPO-TESTOSTERONE) 100 MG/ML solution injection, Inject 1 mL into the appropriate muscle as directed by prescriber 1 (One) Time Per Week., Disp: 10 mL, Rfl: 0    Vitamin D, Ergocalciferol, 88384 units capsule, Take 1 capsule by mouth 1 (One) Time Per Week., Disp: 8 capsule, Rfl: 3     Physical Exam  Visit Vitals  /80 (BP Location: Left arm, Patient Position: Sitting, Cuff Size: Adult)       Labs  Brief Urine Lab Results       None            Lab Results   Component Value Date    GLUCOSE 107 (H) 01/26/2023    CALCIUM 9.5 01/26/2023     01/26/2023    K 4.8 01/26/2023    CO2 30.5 (H) 01/26/2023    CL 99 01/26/2023    BUN 19 01/26/2023    CREATININE 0.70 (L) 01/26/2023    EGFRIFAFRI 147 01/11/2022    EGFRIFNONA 122 01/11/2022    BCR 27.1 (H) 01/26/2023    ANIONGAP 13.1 07/23/2018       Lab Results   Component " Value Date    WBC 3.86 01/26/2023    HGB 15.7 04/03/2024    HCT 47.0 04/03/2024    MCV 92.3 01/26/2023     01/26/2023        Lab Results   Component Value Date    PSA 0.383 03/27/2023       Lab Results   Component Value Date    TESTFRE 25.4 (H) 04/03/2024    TESTOSTEROTT >1500 (H) 04/03/2024        Radiographic Studies  No Images in the past 120 days found..      I have reviewed above labs and imaging.     Assessment  43 y.o. male with chronic hypogonadism, without good effect on oral testosterone / Tlando, switched to  weekly and levels are now extremely supatherapeutic. Admits to sometimes injecting more than 1 mL due to continued low energy.  Struggles with CPAP usage.     Labs were tested the day prior to an injection.    Plan  1.  Switch subcutaneous Xyosted 75 weekly, autoinjector pen  2.  Follow-up in 3 months with total and free testosterone, hematocrit  3.  Referral to ENT to discuss Inspire for NICOLASA

## 2024-04-19 ENCOUNTER — TELEPHONE (OUTPATIENT)
Dept: UROLOGY | Facility: CLINIC | Age: 44
End: 2024-04-19

## 2024-04-19 NOTE — TELEPHONE ENCOUNTER
Caller: MAKEDA NAVARRO    Relationship: SELF    Best call back number: [phone] (home)     What is the best time to reach you: ANY    Who are you requesting to speak with (clinical staff, provider,  specific staff member): DR ACUNA OR STAFF    Do you know the name of the person who called: PT CALLED OFFICE    What was the call regarding: PT CALLED TO STATE THAT     Testosterone Enanthate (Xyosted) 75 MG/0.5ML solution auto-injector   IS TOO EXPENSIVE AT $100. CAN HE PLEASE GO BACK TO OLD MEDICATION INSTEAD? PLEASE CALL PT BACK TO DISCUSS OPTIONS. THANK YOU.    Is it okay if the provider responds through [facility]: NO

## 2024-04-22 ENCOUNTER — TELEPHONE (OUTPATIENT)
Dept: UROLOGY | Facility: CLINIC | Age: 44
End: 2024-04-22

## 2024-04-22 NOTE — TELEPHONE ENCOUNTER
Caller: Robbin Arteaga    Relationship to patient: Self    Best call back number: [phone]    Patient is needing: PT CALLED AND STATED HE DOES NOT NEED APPT SCHEDULED. HE STATED HE WANTS TO KEEP THE SAME MEDICATION THAT HE WAS TAKING AND NOT TAKE THE TESTERONE AUTO INJECTOR.

## 2024-04-22 NOTE — TELEPHONE ENCOUNTER
Patient was called and advised he needed to be scheduled to switch medications back to his previous testosterone medication but patient is refusing an appt. Please advise.

## 2024-04-30 ENCOUNTER — TELEMEDICINE (OUTPATIENT)
Dept: UROLOGY | Facility: CLINIC | Age: 44
End: 2024-04-30
Payer: COMMERCIAL

## 2024-04-30 DIAGNOSIS — E29.1 HYPOGONADISM IN MALE: ICD-10-CM

## 2024-04-30 RX ORDER — TESTOSTERONE CYPIONATE 200 MG/ML
200 INJECTION, SOLUTION INTRAMUSCULAR WEEKLY
Qty: 4 ML | Refills: 0 | Status: SHIPPED | OUTPATIENT
Start: 2024-04-30

## 2024-04-30 NOTE — PROGRESS NOTES
"       Office Visit Established Male Patient     Patient Name: Robbin Arteaga  : 1980   MRN: [id number]     Chief Complaint: No chief complaint on file.      History of Present Illness: Mr. Robbin Arteaga is a 43 y.o. male who presents today for follow up for hypogonadism on TRT.  Patient was last seen on 24 with Dr. Cao and was having issues drawing up TC from 10 mL vial.  Last visit testosterone >1500 and was switched from TC to xyosted.  Patient reports injecting the day before labs.  He also states that xyosted is $100 for one month supply and wishes to change back to TC.  He does not feel his lab reading was accurate.     Video visit conducted using video and audio.  Patient confirmed location is [place].  Patient consented to telehealth video visit.         Subjective      Review of System:   As noted in HPI.    Past Medical History:   Past Medical History:   Diagnosis Date    Hiatal hernia     Hormone disorder     Hypertension     STD (sexually transmitted disease)        Past Surgical History:   Past Surgical History:   Procedure Laterality Date    LEG SURGERY         Family History:   Family History   Problem Relation Age of Onset    Cancer Father        Social History:   Social History     Socioeconomic History    Marital status:    Tobacco Use    Smoking status: Former     Current packs/day: 0.00     Average packs/day: 0.5 packs/day for 5.0 years (2.5 ttl pk-yrs)     Types: Cigarettes     Start date: 2003     Quit date: 2008     Years since quittin.3     Passive exposure: Past    Smokeless tobacco: Never   Vaping Use    Vaping status: Never Used   Substance and Sexual Activity    Alcohol use: No    Drug use: No    Sexual activity: Yes     Partners: Female       Medications:     Current Outpatient Medications:     Needle, Disp, 18G X 1-1/2\" misc, Use for drawing up the medication, Disp: 50 each, Rfl: 3    Needle, Disp, 23G X 1-1/2\" misc, Use 1 Device 1 " (One) Time Per Week., Disp: 30 each, Rfl: 11    Syringe, Disposable, 3 ML misc, Use 1 syringe 1 (One) Time Per Week., Disp: 100 each, Rfl: 11    Alcohol Swabs pads, Clean area prior to injection, Disp: 100 each, Rfl: 11    nebivolol (BYSTOLIC) 10 MG tablet, Take 1 tablet by mouth Daily., Disp: 90 tablet, Rfl: 3    SUBOXONE 8-2 MG film film, take 2 FILMs under the tongue once daily, Disp: , Rfl: 0    Testosterone Cypionate (DEPOTESTOTERONE CYPIONATE) 200 MG/ML injection, Inject 1 mL into the appropriate muscle as directed by prescriber 1 (One) Time Per Week., Disp: 4 mL, Rfl: 0    Vitamin D, Ergocalciferol, 31313 units capsule, Take 1 capsule by mouth 1 (One) Time Per Week., Disp: 8 capsule, Rfl: 3    Allergies:   Allergies   Allergen Reactions    Penicillins Other (See Comments)     Pt states it happened when he was a kid and he is unsure of the reaction        Objective     Physical Exam: Limited due to telehealth visit  Vital Signs: There were no vitals filed for this visit.  There is no height or weight on file to calculate BMI.   Physical Exam  Constitutional:       General: He is awake. He is not in acute distress.  Pulmonary:      Effort: Pulmonary effort is normal.   Neurological:      Mental Status: He is alert and oriented to person, place, and time. Mental status is at baseline.   Psychiatric:         Attention and Perception: Attention normal.         Mood and Affect: Mood normal.         Speech: Speech normal.         Behavior: Behavior is cooperative.         Cognition and Memory: Cognition normal.              Labs  Brief Urine Lab Results       None            Lab Results   Component Value Date    GLUCOSE 107 (H) 01/26/2023    CALCIUM 9.5 01/26/2023     01/26/2023    K 4.8 01/26/2023    CO2 30.5 (H) 01/26/2023    CL 99 01/26/2023    BUN 19 01/26/2023    CREATININE 0.70 (L) 01/26/2023    EGFRIFAFRI 147 01/11/2022    EGFRIFNONA 122 01/11/2022    BCR 27.1 (H) 01/26/2023    ANIONGAP 13.1 07/23/2018        Lab Results   Component Value Date    WBC 3.86 2023    HGB 15.7 2024    HCT 47.0 2024    MCV 92.3 2023     2023            Lab Results   Component Value Date    PSA 0.383 2023       Results Encounter on 2023   Component Date Value Ref Range Status    Testosterone, Total 2024 >1500 (H)  264 - 916 ng/dL Final    Adult male reference interval is based on a population of  healthy nonobese males (BMI <30) between 19 and 39 years old.  Sergio, et.al. JCEM 2017,102;8949-8056. PMID: 25617689.    Testosterone, Free 2024 25.4 (H)  6.8 - 21.5 pg/mL Final    Hemoglobin 2024 15.7  13.0 - 17.7 g/dL Final    Hematocrit 2024 47.0  37.5 - 51.0 % Final   Lab on 2023   Component Date Value Ref Range Status    Sperm - Post Vasectomy 2023 No sperm seen  No sperm seen Final   Results Encounter on 2023   Component Date Value Ref Range Status    Testosterone, Total 2023 150 (L)  264 - 916 ng/dL Final    Adult male reference interval is based on a population of  healthy nonobese males (BMI <30) between 19 and 39 years old.  Travalma, et.al. JCEM 2017,102;3202-6613. PMID: 12959428.  **Verified by repeat analysis**    Testosterone, Free 2023 5.5 (L)  6.8 - 21.5 pg/mL Final   Results Encounter on 2023   Component Date Value Ref Range Status    Hemoglobin 2023 14.2  13.0 - 17.7 g/dL Final    Hematocrit 2023 42.3  37.5 - 51.0 % Final   Procedure visit on 2023   Component Date Value Ref Range Status    Reference Lab Report 2023    Final                    Value:[facility]  [address] [place] [address]  Phone: [phone] or [phone]  Fax: [phone]  Meliton Luevano M.D., Medical Director    PATIENT NAME                           LABORATORY NO.  [id number]  MAKEDA NAVARRO.                   [id number]  [id number]                         AGE              SEX  SSN   "         CLIENT REF #  [facility]              42      1980  GIFTY    [id number]   [id number]    [address]     REQUESTING M.D.     ATTENDING M.D.     COPY TO.  NARCISO KISER [id number]                 ASHLEY ACUNA  DATE COLLECTED      DATE RECEIVED      DATE REPORTED  06/23/2023 06/23/2023 06/26/2023    DIAGNOSIS:  A.   VAS DEFERENS, STERILIZATION, LEFT:  Vas deferens, complete transected    B.   VAS DEFERENS, STERILIZATION, RIGHT:  Vas deferens, completely transected    CLINICAL HISTORY:  Encounter for vasectomy    SPECIMENS RECEIVED:  A.  VAS DEFERENS, STERILIZATION, LEFT  B.  VAS DEFERENS,                           STERILIZATION, RIGHT    MICROSCOPIC DESCRIPTION:  Tissue blocks are prepared and slides are examined microscopically on all  specimens. See diagnosis for details.    Professional interpretation rendered by Lenard Mobley M.D.,F.C.A.P. at [facility], [address] [place] [address].    GROSS DESCRIPTION:  A.  Labeled \"left vas\".  Consists of pieces of tan cylindrical soft tissue  measuring 0.7 cm in length and 0.2 cm in diameter and is submitted  entirely in 1 block to be sectioned at embedding.  AMBER  B.  Labeled \"right vas\".  Consists of 1 piece of tan cylindrical soft tissue  measuring 0.6 cm in length and 0.2 cm in diameter and is submitted  entirely in 1 block to be sectioned at embedding.    REVIEWED, DIAGNOSED AND ELECTRONICALLY  SIGNED BY:    Lenard Mobley M.D.,F.C.A.P.  CPT CODES:  88302x2             I have reviewed the above labs.     Assessment / Plan      Assessment:   Diagnoses and all orders for this visit:    1. Hypogonadism in male  -     Needle, Disp, 23G X 1-1/2\" misc; Use 1 Device 1 (One) Time Per Week.  Dispense: 30 each; Refill: 11  -     Needle, Disp, 18G X 1-1/2\" misc; Use for drawing up the medication  Dispense: 50 each; Refill: 3  -     Syringe, Disposable, 3 ML misc; Use 1 syringe 1 (One) Time Per Week.  Dispense: 100 " each; Refill: 11  -     Testosterone Cypionate (DEPOTESTOTERONE CYPIONATE) 200 MG/ML injection; Inject 1 mL into the appropriate muscle as directed by prescriber 1 (One) Time Per Week.  Dispense: 4 mL; Refill: 0         43 y.o. male presents for hypogonadism on TRT.  Patient was previously on  mg and labs show >1500 testosterone.  Hct WNL.  Was recently switched to xyosted and reports he cannot pay $100 per month and wishes to go back to TC.  He does report injecting the day before labs which will give inaccurate results.  Discussed patient needs to inject 3-4 days prior to lab draw and is to have labs prior to 11 am.  Verbalized understanding.  Will continue  mg weekly and follow up in 3 months with labs.  He does have CPAP and is non compliant as he has lost weight and feels this has corrected NICOLASA.      Plan:    Discontinue xyosted  Restart  mg IM weekly  Recheck testosterone and Hgb/Hct in 3 months prior to follow up.      Follow Up:   3 months with labs prior, video visit      DEBBI Blackwell  [facility]

## 2024-05-22 DIAGNOSIS — E29.1 HYPOGONADISM IN MALE: ICD-10-CM

## 2024-05-22 RX ORDER — TESTOSTERONE CYPIONATE 200 MG/ML
200 INJECTION, SOLUTION INTRAMUSCULAR WEEKLY
Qty: 4 ML | Refills: 0 | Status: SHIPPED | OUTPATIENT
Start: 2024-05-22

## 2024-06-20 DIAGNOSIS — E29.1 HYPOGONADISM IN MALE: ICD-10-CM

## 2024-06-21 RX ORDER — TESTOSTERONE CYPIONATE 200 MG/ML
200 INJECTION, SOLUTION INTRAMUSCULAR WEEKLY
Qty: 4 ML | Refills: 0 | Status: SHIPPED | OUTPATIENT
Start: 2024-06-21

## 2024-07-20 ENCOUNTER — LAB (OUTPATIENT)
Dept: LAB | Facility: HOSPITAL | Age: 44
End: 2024-07-20
Payer: COMMERCIAL

## 2024-07-20 PROCEDURE — 84402 ASSAY OF FREE TESTOSTERONE: CPT | Performed by: UROLOGY

## 2024-07-20 PROCEDURE — 85018 HEMOGLOBIN: CPT | Performed by: UROLOGY

## 2024-07-20 PROCEDURE — 85014 HEMATOCRIT: CPT | Performed by: UROLOGY

## 2024-07-20 PROCEDURE — 84403 ASSAY OF TOTAL TESTOSTERONE: CPT | Performed by: UROLOGY

## 2024-07-23 ENCOUNTER — TELEPHONE (OUTPATIENT)
Dept: UROLOGY | Facility: CLINIC | Age: 44
End: 2024-07-23
Payer: COMMERCIAL

## 2024-07-23 DIAGNOSIS — E29.1 HYPOGONADISM IN MALE: ICD-10-CM

## 2024-07-23 RX ORDER — TESTOSTERONE CYPIONATE 200 MG/ML
200 INJECTION, SOLUTION INTRAMUSCULAR WEEKLY
Qty: 4 ML | Refills: 0 | Status: SHIPPED | OUTPATIENT
Start: 2024-07-23

## 2024-07-30 ENCOUNTER — TELEMEDICINE (OUTPATIENT)
Dept: UROLOGY | Facility: CLINIC | Age: 44
End: 2024-07-30
Payer: COMMERCIAL

## 2024-07-30 DIAGNOSIS — E29.1 HYPOGONADISM IN MALE: Primary | ICD-10-CM

## 2024-07-30 NOTE — PROGRESS NOTES
"       Office Visit Follow Up      Patient Name: Robbin Arteaga  : 1980   MRN: [id number]     Chief Complaint:   Chief Complaint   Patient presents with    Hypogonadism     Video visit conducted using video and audio.  Patient confirmed location is [place].  Patient consented to telehealth video visit.      Referring Provider: No ref. provider found    History of Present Illness: Robbin Arteaga is a 43 y.o. male who presents today for follow up with hypogonadism.  He is doing great, lost 25 pounds, is able to participate in sports and run with his son.  States he did not know how bad he felt until he was treated with TRT.        Subjective      Review of System:   As noted in HPI      Past Medical History:   Past Medical History:   Diagnosis Date    Hiatal hernia     Hormone disorder     Hypertension     STD (sexually transmitted disease)        Past Surgical History:   Past Surgical History:   Procedure Laterality Date    LEG SURGERY         Family History:   Family History   Problem Relation Age of Onset    Cancer Father        Medications:     Current Outpatient Medications:     Alcohol Swabs pads, Clean area prior to injection, Disp: 100 each, Rfl: 11    nebivolol (BYSTOLIC) 10 MG tablet, Take 1 tablet by mouth Daily., Disp: 90 tablet, Rfl: 3    Needle, Disp, 18G X 1-1/2\" misc, Use for drawing up the medication, Disp: 50 each, Rfl: 3    Needle, Disp, 23G X 1-1/2\" misc, Use 1 Device 1 (One) Time Per Week., Disp: 30 each, Rfl: 11    SUBOXONE 8-2 MG film film, take 2 FILMs under the tongue once daily, Disp: , Rfl: 0    Syringe, Disposable, 3 ML misc, Use 1 syringe 1 (One) Time Per Week., Disp: 100 each, Rfl: 11    Testosterone Cypionate (DEPOTESTOTERONE CYPIONATE) 200 MG/ML injection, Inject 1 mL into the appropriate muscle as directed by prescriber 1 (One) Time Per Week., Disp: 4 mL, Rfl: 0    Vitamin D, Ergocalciferol, 44581 units capsule, Take 1 capsule by mouth 1 (One) Time Per Week., " Disp: 8 capsule, Rfl: 3    Allergies:   Allergies   Allergen Reactions    Penicillins Other (See Comments)     Pt states it happened when he was a kid and he is unsure of the reaction        Objective     Physical Exam:   Vital Signs: There were no vitals filed for this visit.  There is no height or weight on file to calculate BMI.     Physical Exam  Constitutional:       General: He is awake. He is not in acute distress.     Comments: Limited due to video visit    Pulmonary:      Effort: Pulmonary effort is normal.   Neurological:      Mental Status: He is alert and oriented to person, place, and time. Mental status is at baseline.   Psychiatric:         Attention and Perception: Attention normal.         Mood and Affect: Mood normal.         Speech: Speech normal.         Behavior: Behavior is cooperative.         Thought Content: Thought content normal.         Cognition and Memory: Cognition normal.          Labs:   Brief Urine Lab Results       None                 Lab Results   Component Value Date    TESTOSTERONE 250.8 (L) 01/26/2023    TESTOSTERONE 878 12/14/2016    TESTOSTERONE 113 (A) 05/09/2016    TESTOSTERONE 406 12/30/2015    TESTOSTERONE 1,337 (H) 02/19/2015    TESTOSTERONE 787 07/21/2014    TESTOSTERONE 707 01/11/2014    TESTFRE 35.3 (H) 07/20/2024    TESTFRE 25.4 (H) 04/03/2024    TESTFRE 5.5 (L) 09/11/2023    TESTFRE <0.2 (L) 01/26/2023    TESTFRE 4.2 (L) 07/08/2020    TESTFRE 5.7 (L) 07/25/2017    TESTFRE 17.9 12/14/2016    TESTFRE 3.3 (A) 05/09/2016    TESTFRE 8.5 (A) 12/30/2015    TESTFRE 39.7 (H) 02/19/2015    TESTFRE 17.3 07/21/2014    TESTFRE 14.9 01/11/2014       Lab Results   Component Value Date    PSA 0.383 03/27/2023       Lab Results   Component Value Date    WBC 3.86 01/26/2023    HGB 16.5 07/20/2024    HCT 49.3 07/20/2024    MCV 92.3 01/26/2023     01/26/2023           I have reviewed the above labs.  Assessment / Plan      Assessment:   Diagnoses and all orders for this  visit:    1. Hypogonadism in male (Primary)  -     Testosterone; Future  -     Hemoglobin & Hematocrit, Blood; Future         43 y.o. male presents for follow up for hypogonadism.  He is doing great.      Labs obtained 07/20/24 at 0950  Total Testosterone: >1500   Free Testosterone: 35.3  Hematocrit:  49.3  Hemoglobin:  16.5    Patient reports obtaining labs day after injection as he was going out of town.  He is aware to obtain labs 3-4 days after injection for most accurate results.  Given increasing Hct will decrease dose to 150 mg IM weekly.  Will follow up in 6 months with repeat labs.  Patient advised to call with any issues prior to visit.         Plan:    Decrease TC to 150 mg IM weekly  Hgb/Hct and TT in 6 months prior to visit.  Obtain 3-4 days after injection prior to 10 am.    Follow up in 6 months with video visit and labs prior.      Follow Up:   Return in about 6 months (around 1/30/2025) for recheck with Chastity, labs prior, Video visit.    DEBBI Blackwell  [facility]

## 2024-08-05 ENCOUNTER — OFFICE VISIT (OUTPATIENT)
Dept: FAMILY MEDICINE CLINIC | Facility: CLINIC | Age: 44
End: 2024-08-05
Payer: COMMERCIAL

## 2024-08-05 VITALS
WEIGHT: 188 LBS | HEART RATE: 70 BPM | SYSTOLIC BLOOD PRESSURE: 124 MMHG | HEIGHT: 67 IN | OXYGEN SATURATION: 99 % | BODY MASS INDEX: 29.51 KG/M2 | DIASTOLIC BLOOD PRESSURE: 88 MMHG

## 2024-08-05 DIAGNOSIS — K21.9 HIATAL HERNIA WITH GERD WITHOUT ESOPHAGITIS: ICD-10-CM

## 2024-08-05 DIAGNOSIS — I10 ESSENTIAL HYPERTENSION: ICD-10-CM

## 2024-08-05 DIAGNOSIS — E29.1 HYPOGONADISM IN MALE: Primary | ICD-10-CM

## 2024-08-05 DIAGNOSIS — K44.9 HIATAL HERNIA WITH GERD WITHOUT ESOPHAGITIS: ICD-10-CM

## 2024-08-05 DIAGNOSIS — G47.01 INSOMNIA DUE TO MEDICAL CONDITION: ICD-10-CM

## 2024-08-05 PROCEDURE — 99214 OFFICE O/P EST MOD 30 MIN: CPT | Performed by: FAMILY MEDICINE

## 2024-08-05 RX ORDER — PREDNISONE 10 MG/1
TABLET ORAL
COMMUNITY
Start: 2024-07-28 | End: 2024-08-05

## 2024-08-05 RX ORDER — TRAZODONE HYDROCHLORIDE 50 MG/1
50 TABLET ORAL NIGHTLY
Qty: 90 TABLET | Refills: 3 | Status: SHIPPED | OUTPATIENT
Start: 2024-08-05

## 2024-08-05 NOTE — PROGRESS NOTES
Established Patient        Chief Complaint:   Chief Complaint   Patient presents with    Follow-up     6 month f/u HTN        Robbin Arteaga is a 43 y.o. male    History of Present Illness:   Here today in scheduled follow-up visit of his low testosterone, hypertension, hiatal hernia with GERD and insomnia.    He denies any problems with his current medication regimen.  He reports marked improvement globally with respect to his overall energy level, lean body mass production and exertional tolerance.    Denies chest pain, syncope, palpitations or vertigo.  Denies fever, chills or night sweats.  Maintains an active lifestyle, balanced dietary intake; reports good hydration habits.  Denies hematuria/dysuria.  Denies any BRB/BTS.  No new rashes.  Denies orthopnea, epistaxis or hemoptysis.    Subjective     The following portions of the patient's history were reviewed and updated as appropriate: allergies, current medications, past family history, past medical history, past social history, past surgical history and problem list.    Allergies   Allergen Reactions    Penicillins Other (See Comments)     Pt states it happened when he was a kid and he is unsure of the reaction        Review of Systems  Constitutional: Negative for fever. Negative for chills, diaphoresis, fatigue and unexpected weight change.   HENT: No dysphagia; no changes to vision/hearing/smell/taste; no epistaxis  Eyes: Negative for redness and visual disturbance.   Respiratory: negative for shortness of breath. Negative for chest pain . Negative for cough and chest tightness.   Cardiovascular: Negative for chest pain and palpitations.   Gastrointestinal: Negative for abdominal distention, abdominal pain and blood in stool.   Endocrine: Negative for cold intolerance and heat intolerance.   Genitourinary: Negative for difficulty urinating, dysuria and frequency.   Musculoskeletal: Negative for arthralgias, back pain and  "myalgias.   Skin: Negative for color change, rash and wound.   Neurological: Negative for syncope, weakness and headaches.   Hematological: Negative for adenopathy. Does not bruise/bleed easily.   Psychiatric/Behavioral: Negative for confusion. The patient is not nervous/anxious.    Objective     Physical Exam   Vital Signs: /88   Pulse 70   Ht 170.2 cm (67\")   Wt 85.3 kg (188 lb)   SpO2 99%   BMI 29.44 kg/m²       Patient's (Body mass index is 29.44 kg/m².) indicates that they are obese (BMI >30) with health related conditions that include obstructive sleep apnea, hypertension, and GERD . Weight is unchanged. BMI is is above average; BMI management plan is completed. We discussed portion control and increasing exercise.      General Appearance: alert, oriented x 3, no acute distress.  Skin: warm and dry.   HEENT: Atraumatic.  pupils round and reactive to light and accommodation, oral mucosa pink and moist.  Nares patent without epistaxis.  External auditory canals are patent tympanic membranes intact.  Neck: supple, no JVD, trachea midline.  No thyromegaly  Lungs: CTA, unlabored breathing effort.  Heart: RRR, normal S1 and S2, no S3, no rub.  Abdomen: soft, non-tender, no palpable bladder, present bowel sounds to auscultation ×4.  No guarding or rigidity.  Extremities: no clubbing, cyanosis or edema.  Good range of motion actively and passively.  Symmetric muscle strength and development  Neuro: normal speech and mental status.  Cranial nerves II through XII intact.  No anosmia. DTR 2+; proprioception intact.  No focal motor/sensory deficits.    Advance Care Planning   ACP discussion was held with the patient during this visit. Patient does not have an advance directive, information provided.       Assessment and Plan      Assessment/Plan:   Diagnoses and all orders for this visit:    1. Hypogonadism in male (Primary)    2. Essential hypertension    3. Hiatal hernia with GERD without " esophagitis      Vital signs demonstrate hemodynamic stability, blood pressure is at goal.  Discussed need for reduction in sodium/salt/caffeine intake; improve sleep habits as able; inc formal CV exercise program with goal of vigorous activity most, if not all, days of the week; goal of 150 min of sustained HR CV exercise total per week.    Anti - reflux measures, trigger foods and drinks to avoid: Fatty foods, alcohol, chocolate, coffee, tea, caffeinated soft drinks (decaffeinated coffee still has some caffeine), peppermint and spearmint, spices and vinegar, citrus fruits and juices, tomatoes and tomato sauces, and smoking. Other antireflux measures include weight reduction if overweight, avoid tight clothing around the abdomen, elevate the head of her bed 6 inches (May use a bed wedge which is placed between the mattress in box [place]) or blocks under the head of the bed, don't drink or eat for 2 hours before going to bed and avoid lying down immediately after meals.    Continue testosterone supplementation.  Keep scheduled follow-up appointment with urology.  Reviewed most recent surveillance labs with him today, he has pending labs for repeat at relegated interval as per urology guidance.      Discussion Summary:    Discussed plan of care in detail with pt today; pt verb understanding and agrees.    I spent 30 minutes caring for Robbin on this date of service. This time includes time spent by me in the following activities:preparing for the visit, reviewing tests, performing a medically appropriate examination and/or evaluation , counseling and educating the patient/family/caregiver, ordering medications, tests, or procedures, documenting information in the medical record, and care coordination    I confirm accuracy of unchanged data/findings which have been carried forward from previous visit, as well as I have updated appropriately those that have changed.      Follow up:  Return in about 6 months (around  2/5/2025) for Annual physical.     There are no Patient Instructions on file for this visit.    Rodney Smith DO  08/05/24  16:44 EDT

## 2024-08-15 ENCOUNTER — PATIENT MESSAGE (OUTPATIENT)
Dept: UROLOGY | Facility: CLINIC | Age: 44
End: 2024-08-15
Payer: COMMERCIAL

## 2024-08-15 DIAGNOSIS — E29.1 HYPOGONADISM IN MALE: ICD-10-CM

## 2024-08-15 RX ORDER — TESTOSTERONE CYPIONATE 200 MG/ML
150 INJECTION, SOLUTION INTRAMUSCULAR WEEKLY
Qty: 4 ML | Refills: 0 | Status: SHIPPED | OUTPATIENT
Start: 2024-08-15

## 2024-09-10 ENCOUNTER — PATIENT MESSAGE (OUTPATIENT)
Dept: UROLOGY | Facility: CLINIC | Age: 44
End: 2024-09-10
Payer: COMMERCIAL

## 2024-09-10 DIAGNOSIS — E29.1 HYPOGONADISM IN MALE: ICD-10-CM

## 2024-09-10 RX ORDER — TESTOSTERONE CYPIONATE 200 MG/ML
150 INJECTION, SOLUTION INTRAMUSCULAR WEEKLY
Qty: 4 ML | Refills: 0 | Status: SHIPPED | OUTPATIENT
Start: 2024-09-10

## 2024-10-10 DIAGNOSIS — E29.1 HYPOGONADISM IN MALE: ICD-10-CM

## 2024-10-10 RX ORDER — TESTOSTERONE CYPIONATE 200 MG/ML
150 INJECTION, SOLUTION INTRAMUSCULAR WEEKLY
Qty: 4 ML | Refills: 0 | Status: SHIPPED | OUTPATIENT
Start: 2024-10-10

## 2024-11-18 ENCOUNTER — PATIENT MESSAGE (OUTPATIENT)
Dept: UROLOGY | Facility: CLINIC | Age: 44
End: 2024-11-18
Payer: COMMERCIAL

## 2024-11-18 DIAGNOSIS — E29.1 HYPOGONADISM IN MALE: ICD-10-CM

## 2024-11-18 RX ORDER — TESTOSTERONE CYPIONATE 200 MG/ML
150 INJECTION, SOLUTION INTRAMUSCULAR WEEKLY
Qty: 4 ML | Refills: 0 | Status: CANCELLED | OUTPATIENT
Start: 2024-11-18

## 2024-11-19 RX ORDER — TESTOSTERONE CYPIONATE 200 MG/ML
150 INJECTION, SOLUTION INTRAMUSCULAR WEEKLY
Qty: 4 ML | Refills: 0 | Status: SHIPPED | OUTPATIENT
Start: 2024-11-19

## 2024-12-11 DIAGNOSIS — E29.1 HYPOGONADISM IN MALE: ICD-10-CM

## 2024-12-11 RX ORDER — TESTOSTERONE CYPIONATE 200 MG/ML
150 INJECTION, SOLUTION INTRAMUSCULAR WEEKLY
Qty: 4 ML | Refills: 0 | Status: SHIPPED | OUTPATIENT
Start: 2024-12-11

## 2024-12-16 ENCOUNTER — LAB (OUTPATIENT)
Dept: LAB | Facility: HOSPITAL | Age: 44
End: 2024-12-16
Payer: COMMERCIAL

## 2024-12-16 DIAGNOSIS — E29.1 HYPOGONADISM IN MALE: ICD-10-CM

## 2024-12-16 LAB
HCT VFR BLD AUTO: 45.9 % (ref 37.5–51)
HGB BLD-MCNC: 15.7 G/DL (ref 13–17.7)
TESTOST SERPL-MCNC: 1163 NG/DL (ref 249–836)

## 2024-12-16 PROCEDURE — 84403 ASSAY OF TOTAL TESTOSTERONE: CPT

## 2024-12-16 PROCEDURE — 85018 HEMOGLOBIN: CPT

## 2024-12-16 PROCEDURE — 85014 HEMATOCRIT: CPT

## 2024-12-16 PROCEDURE — 36415 COLL VENOUS BLD VENIPUNCTURE: CPT

## 2025-01-08 ENCOUNTER — PATIENT MESSAGE (OUTPATIENT)
Dept: UROLOGY | Facility: CLINIC | Age: 45
End: 2025-01-08
Payer: COMMERCIAL

## 2025-01-08 DIAGNOSIS — E29.1 HYPOGONADISM IN MALE: ICD-10-CM

## 2025-01-10 RX ORDER — TESTOSTERONE CYPIONATE 200 MG/ML
150 INJECTION, SOLUTION INTRAMUSCULAR WEEKLY
Qty: 4 ML | Refills: 0 | Status: SHIPPED | OUTPATIENT
Start: 2025-01-10

## 2025-01-10 NOTE — TELEPHONE ENCOUNTER
Patient is compliant with TRT testosterone replacement policy    Last OV visit  7/30/24    Last LABS    12/16/24      Next scheduled OV visit  1/31/25    Refill due  1/11/25    Confirmed pharmacy  [facility]

## 2025-01-31 ENCOUNTER — OFFICE VISIT (OUTPATIENT)
Dept: UROLOGY | Facility: CLINIC | Age: 45
End: 2025-01-31
Payer: COMMERCIAL

## 2025-01-31 VITALS
OXYGEN SATURATION: 97 % | DIASTOLIC BLOOD PRESSURE: 80 MMHG | HEART RATE: 73 BPM | WEIGHT: 180 LBS | TEMPERATURE: 97 F | SYSTOLIC BLOOD PRESSURE: 124 MMHG | BODY MASS INDEX: 28.25 KG/M2 | HEIGHT: 67 IN

## 2025-01-31 DIAGNOSIS — E29.1 HYPOGONADISM IN MALE: ICD-10-CM

## 2025-01-31 RX ORDER — TESTOSTERONE CYPIONATE 200 MG/ML
100 INJECTION, SOLUTION INTRAMUSCULAR WEEKLY
Qty: 2 ML | Refills: 0 | Status: SHIPPED | OUTPATIENT
Start: 2025-01-31

## 2025-01-31 NOTE — PROGRESS NOTES
Office Visit     Patient Name: Robbin Arteaga  : 1980   MRN: [id number]     Chief Complaint:   Chief Complaint   Patient presents with    testosterone     Referring Provider: No ref. provider found    Primary Care Provider: Rodney Smith DO     History of Present Illness: Robbin Arteaga is a 44 y.o. male presents for hypogonadism follow up on TRT.  TC decreased to 150 mg IM weekly at last visit due to increasing Hct.  Labs were obtained day after injection resulting inaccurate TT labs.  Patient is currently injecting 0.75 mg IM weekly on Wednesday.  Labs obtained on a Monday at 1501.  He reports he is feeling so much better on TRT.  He is back to doing his normal activity.  Lost 30 pounds since starting TRT.  He reports he has been struggling that his workouts are not as good as expected and decreased sex drive.  States he has dips and valleys.  Denies breast tenderness or growth.    Subjective   Review of System:   As noted in HPI.    Past Medical History:   Diagnosis Date    Hiatal hernia     Hormone disorder     Hypertension     STD (sexually transmitted disease)      Past Surgical History:   Procedure Laterality Date    LEG SURGERY       Family History   Problem Relation Age of Onset    Cancer Father      Social History     Socioeconomic History    Marital status:    Tobacco Use    Smoking status: Former     Current packs/day: 0.00     Average packs/day: 0.5 packs/day for 5.0 years (2.5 ttl pk-yrs)     Types: Cigarettes     Start date: 2003     Quit date: 2008     Years since quittin.0     Passive exposure: Past    Smokeless tobacco: Never   Vaping Use    Vaping status: Never Used   Substance and Sexual Activity    Alcohol use: No    Drug use: No    Sexual activity: Yes     Partners: Female       Current Outpatient Medications:     Alcohol Swabs pads, Clean area prior to injection, Disp: 100 each, Rfl: 11    nebivolol (BYSTOLIC) 10 MG tablet, Take 1 tablet  "by mouth Daily., Disp: 90 tablet, Rfl: 3    Needle, Disp, 18G X 1-1/2\" misc, Use for drawing up the medication, Disp: 50 each, Rfl: 3    Needle, Disp, 23G X 1-1/2\" misc, Use 1 Device 1 (One) Time Per Week., Disp: 30 each, Rfl: 11    SUBOXONE 8-2 MG film film, take 2 FILMs under the tongue once daily, Disp: , Rfl: 0    Syringe, Disposable, 3 ML misc, Use 1 syringe 1 (One) Time Per Week., Disp: 100 each, Rfl: 11    Testosterone Cypionate (DEPOTESTOTERONE CYPIONATE) 200 MG/ML injection, Inject 0.5 mL into the appropriate muscle as directed by prescriber 1 (One) Time Per Week., Disp: 2 mL, Rfl: 0    Vitamin D, Ergocalciferol, 47419 units capsule, Take 1 capsule by mouth 1 (One) Time Per Week., Disp: 8 capsule, Rfl: 3    Allergies   Allergen Reactions    Penicillins Other (See Comments)     Pt states it happened when he was a kid and he is unsure of the reaction      Objective   Visit Vitals  /80   Pulse 73   Temp 97 °F (36.1 °C)   Ht 170.2 cm (67\")   Wt 81.6 kg (180 lb)   SpO2 97%   BMI 28.19 kg/m²        Body mass index is 28.19 kg/m².     Physical Exam  Vitals and nursing note reviewed.   Constitutional:       General: He is awake. He is not in acute distress.     Appearance: He is not ill-appearing.   Pulmonary:      Effort: Pulmonary effort is normal.   Skin:     General: Skin is warm and dry.   Neurological:      Mental Status: He is alert and oriented to person, place, and time. Mental status is at baseline.   Psychiatric:         Mood and Affect: Mood normal.         Behavior: Behavior is cooperative.          Labs  Lab Results   Component Value Date    HCT 45.9 12/16/2024    HCT 49.3 07/20/2024    HCT 47.0 04/03/2024    HCT 42.3 09/11/2023     Lab Results   Component Value Date    HGB 15.7 12/16/2024    HGB 16.5 07/20/2024    HGB 15.7 04/03/2024    HGB 14.2 09/11/2023     Lab Results   Component Value Date    TESTOSTEROTT >1500 (H) 07/20/2024    TESTOSTEROTT >1500 (H) 04/03/2024    TESTOSTEROTT 150 (L) " 09/11/2023    TESTOSTEROTT 156 (L) 07/08/2020     Lab Results   Component Value Date    TESTFRE 35.3 (H) 07/20/2024    TESTFRE 25.4 (H) 04/03/2024    TESTFRE 5.5 (L) 09/11/2023     Lab Results   Component Value Date    PSA 0.383 03/27/2023    ESTRADIOL 18.1 03/27/2023    LH 2.36 03/27/2023    PROLACTIN 6.86 03/27/2023       I have reviewed the above labs.    Assessment / Plan    Assessment:   Diagnoses and all orders for this visit:    1. Hypogonadism in male  -     Testosterone Cypionate (DEPOTESTOTERONE CYPIONATE) 200 MG/ML injection; Inject 0.5 mL into the appropriate muscle as directed by prescriber 1 (One) Time Per Week.  Dispense: 2 mL; Refill: 0  -     Cortisol - AM; Future  -     Testosterone; Future  -     Hemoglobin & Hematocrit, Blood; Future         Robbin Arteaga is a 44 y.o. male presents for hypogonadism follow up on TRT.  TC decreased to 150 mg IM weekly at last visit due to increasing Hct.  Labs were obtained day after injection resulting inaccurate TT labs.  Patient is currently injecting 0.75 mg IM weekly on Wednesday.  Labs obtained on a Monday at 1501.  He reports he is feeling so much better on TRT.  He is back to doing his normal activity.  Lost 30 pounds since starting TRT.  He reports he has been struggling that his workouts are not as good as expected and decreased sex drive.  States he has dips and valleys.  Denies breast tenderness or growth.    Patient's labs obtained at 1501, 5 days after injection.  TT remains supratherapeutic.  Normal Hgb/Hct.  Will decrease to 100 mg IM weekly and I educated we will be sending in multi use vials so he will get 2 injections out of 1 vial.  Discussed his symptoms are not related to hypogonadism with a supratherapeutic TT.  He is concerned about his cortisol.  Will check with follow up labs.  We discussed appropriate date and time to obtain labs.    Patient reviewed and signed zero tolerance policy, [facility] testosterone therapy policy,  and FAQs of testosterone replacement therapy.  All questions were answered and patient agreed to follow policy.  Patient was given signed copies of zero tolerance policy, [facility] testosterone therapy policy, and FAQs of testosterone replacement therapy for reference.      Obtain TT, cortisol, Hgb/Hct before 10 am, 4 days after last injection.    Follow up in 3 months with labs prior.      Plan:  Follow up in 3 months with labs prior as directed-video visit  Hgb/Hct, cortisol, TT prior to follow up  Decrease TC to 100 mg IM weekly      Follow Up:   Return in about 3 months (around 4/30/2025) for recheck with Tete, Video visit, labs prior.    Tete Gonsales, MSN, APRN, FNP-C  [facility]

## 2025-02-06 ENCOUNTER — OFFICE VISIT (OUTPATIENT)
Age: 45
End: 2025-02-06
Payer: COMMERCIAL

## 2025-02-06 VITALS
TEMPERATURE: 97.6 F | OXYGEN SATURATION: 98 % | HEIGHT: 67 IN | WEIGHT: 175 LBS | HEART RATE: 82 BPM | DIASTOLIC BLOOD PRESSURE: 70 MMHG | BODY MASS INDEX: 27.47 KG/M2 | SYSTOLIC BLOOD PRESSURE: 136 MMHG

## 2025-02-06 DIAGNOSIS — E29.1 HYPOGONADISM IN MALE: Primary | ICD-10-CM

## 2025-02-06 DIAGNOSIS — G47.33 OBSTRUCTIVE SLEEP APNEA ON CPAP: ICD-10-CM

## 2025-02-06 DIAGNOSIS — I10 ESSENTIAL HYPERTENSION: ICD-10-CM

## 2025-02-06 PROCEDURE — 99214 OFFICE O/P EST MOD 30 MIN: CPT | Performed by: FAMILY MEDICINE

## 2025-02-06 NOTE — PROGRESS NOTES
Established Patient        Chief Complaint:   Chief Complaint   Patient presents with    Hypogonadism     Follow up        Robbin Arteaga is a 44 y.o. male    History of Present Illness:   Here today in scheduled follow-up visit of low testosterone, hypertension and obstructive sleep apnea syndrome.    Patient denies any problems with current medication regimen.    Denies chest pain, syncope, palpitations or vertigo.  Denies fever, chills or night sweats.  Maintains an active lifestyle, balanced dietary intake; reports good hydration habits.  Denies hematuria/dysuria.  Denies any BRB/BTS.  No new rashes.  Denies orthopnea, epistaxis or hemoptysis.    Subjective     The following portions of the patient's history were reviewed and updated as appropriate: allergies, current medications, past family history, past medical history, past social history, past surgical history and problem list.    Allergies   Allergen Reactions    Penicillins Other (See Comments)     Pt states it happened when he was a kid and he is unsure of the reaction        Review of Systems  Constitutional: Negative for fever. Negative for chills, diaphoresis, fatigue and unexpected weight change.   HENT: No dysphagia; no changes to vision/hearing/smell/taste; no epistaxis  Eyes: Negative for redness and visual disturbance.   Respiratory: negative for shortness of breath. Negative for chest pain . Negative for cough and chest tightness.   Cardiovascular: Negative for chest pain and palpitations.   Gastrointestinal: Negative for abdominal distention, abdominal pain and blood in stool.   Endocrine: Negative for cold intolerance and heat intolerance.   Genitourinary: Negative for difficulty urinating, dysuria and frequency.   Musculoskeletal: Negative for arthralgias, back pain and myalgias.   Skin: Negative for color change, rash and wound.   Neurological: Negative for syncope, weakness and headaches.   Hematological: Negative  "for adenopathy. Does not bruise/bleed easily.   Psychiatric/Behavioral: Negative for confusion. The patient is not nervous/anxious.    Objective     Physical Exam   Vital Signs: /70   Pulse 82   Temp 97.6 °F (36.4 °C)   Ht 170.2 cm (67\")   Wt 79.4 kg (175 lb)   SpO2 98%   BMI 27.41 kg/m²       Patient's (Body mass index is 27.41 kg/m².) indicates that they are overweight (BMI 25-29.9) with health related conditions that include none . Weight is improving with lifestyle modifications. BMI is is above average; BMI management plan is completed. We discussed portion control and increasing exercise.      General Appearance: alert, oriented x 3, no acute distress.  Skin: warm and dry.   HEENT: Atraumatic.  pupils round and reactive to light and accommodation, oral mucosa pink and moist.  Nares patent without epistaxis.  External auditory canals are patent tympanic membranes intact.  Neck: supple, no JVD, trachea midline.  No thyromegaly  Lungs: CTA, unlabored breathing effort.  Heart: RRR, normal S1 and S2, no S3, no rub.  Abdomen: soft, non-tender, no palpable bladder, present bowel sounds to auscultation ×4.  No guarding or rigidity.  Extremities: no clubbing, cyanosis or edema.  Good range of motion actively and passively.  Symmetric muscle strength and development  Neuro: normal speech and mental status.  Cranial nerves II through XII intact.  No anosmia. DTR 2+; proprioception intact.  No focal motor/sensory deficits.    Advance Care Planning   ACP discussion was held with the patient during this visit. Patient does not have an advance directive, information provided.       Assessment and Plan      Assessment/Plan:   Diagnoses and all orders for this visit:    1. Hypogonadism in male (Primary)    2. Essential hypertension    3. Obstructive sleep apnea on CPAP    Vital signs demonstrate hemodynamic stability, blood pressure is at goal.    Continue testosterone supplementation, continues to be followed by " urology.  Planned continuation of surveillance labs.    Continue NIPPV treatment of NICOLASA.        Discussion Summary:    Discussed plan of care in detail with pt today; pt verb understanding and agrees.    I spent 30 minutes caring for Robbin on this date of service. This time includes time spent by me in the following activities:preparing for the visit, performing a medically appropriate examination and/or evaluation , counseling and educating the patient/family/caregiver, ordering medications, tests, or procedures, documenting information in the medical record, and care coordination    I have reviewed and updated all copied forward information, as appropriate.  I attest to the accuracy and relevance of any unchanged information.    Follow up:  Return in about 6 months (around 8/6/2025) for Annual physical.     There are no Patient Instructions on file for this visit.        Rodney Smith DO  02/06/25  09:02 EST

## 2025-03-04 DIAGNOSIS — E29.1 HYPOGONADISM IN MALE: ICD-10-CM

## 2025-03-04 RX ORDER — TESTOSTERONE CYPIONATE 200 MG/ML
100 INJECTION, SOLUTION INTRAMUSCULAR WEEKLY
Qty: 2 ML | Refills: 0 | Status: SHIPPED | OUTPATIENT
Start: 2025-03-04

## 2025-03-04 NOTE — TELEPHONE ENCOUNTER
Patient is compliant with TRT testosterone replacement policy    Last OV visit      1/31/25     Last LABS      12/16/24     Next scheduled OV visit  4/26/25    Refill due  3/3/25    Confirmed pharmacy  [facility]

## 2025-03-17 DIAGNOSIS — G47.01 INSOMNIA DUE TO MEDICAL CONDITION: ICD-10-CM

## 2025-03-18 RX ORDER — TRAZODONE HYDROCHLORIDE 50 MG/1
50 TABLET ORAL NIGHTLY
Qty: 90 TABLET | Refills: 3 | Status: SHIPPED | OUTPATIENT
Start: 2025-03-18

## 2025-03-18 NOTE — TELEPHONE ENCOUNTER
Rx Refill Note  Requested Prescriptions     Pending Prescriptions Disp Refills    traZODone (DESYREL) 50 MG tablet [Pharmacy Med Name: traZODone HCl 50 MG Oral Tablet (DESYREL)] 90 tablet 3     Sig: Take 1 tablet by mouth Every Night.      Last office visit with prescribing clinician: 2/6/2025   Last telemedicine visit with prescribing clinician: Visit date not found   Next office visit with prescribing clinician: 8/7/2025                         Would you like a call back once the refill request has been completed: [] Yes [] No    If the office needs to give you a call back, can they leave a voicemail: [] Yes [] No    iVola Coppola MA  03/18/25, 10:32 EDT

## 2025-03-26 ENCOUNTER — PATIENT MESSAGE (OUTPATIENT)
Dept: UROLOGY | Facility: CLINIC | Age: 45
End: 2025-03-26
Payer: COMMERCIAL

## 2025-03-26 DIAGNOSIS — E29.1 HYPOGONADISM IN MALE: ICD-10-CM

## 2025-03-28 RX ORDER — TESTOSTERONE CYPIONATE 200 MG/ML
100 INJECTION, SOLUTION INTRAMUSCULAR WEEKLY
Qty: 2 ML | Refills: 0 | Status: SHIPPED | OUTPATIENT
Start: 2025-03-28

## 2025-04-01 ENCOUNTER — LAB (OUTPATIENT)
Dept: LAB | Facility: HOSPITAL | Age: 45
End: 2025-04-01
Payer: COMMERCIAL

## 2025-04-01 DIAGNOSIS — E29.1 HYPOGONADISM IN MALE: ICD-10-CM

## 2025-04-01 LAB
CORTIS SERPL-MCNC: 20.2 MCG/DL
HCT VFR BLD AUTO: 47.9 % (ref 37.5–51)
HGB BLD-MCNC: 16.1 G/DL (ref 13–17.7)
TESTOST SERPL-MCNC: 843 NG/DL (ref 249–836)

## 2025-04-01 PROCEDURE — 82533 TOTAL CORTISOL: CPT

## 2025-04-01 PROCEDURE — 85018 HEMOGLOBIN: CPT

## 2025-04-01 PROCEDURE — 84403 ASSAY OF TOTAL TESTOSTERONE: CPT

## 2025-04-01 PROCEDURE — 36415 COLL VENOUS BLD VENIPUNCTURE: CPT

## 2025-04-01 PROCEDURE — 85014 HEMATOCRIT: CPT

## 2025-04-30 ENCOUNTER — OFFICE VISIT (OUTPATIENT)
Dept: UROLOGY | Facility: CLINIC | Age: 45
End: 2025-04-30
Payer: COMMERCIAL

## 2025-04-30 VITALS
DIASTOLIC BLOOD PRESSURE: 76 MMHG | OXYGEN SATURATION: 97 % | HEIGHT: 67 IN | TEMPERATURE: 97.8 F | RESPIRATION RATE: 20 BRPM | HEART RATE: 86 BPM | BODY MASS INDEX: 27.47 KG/M2 | SYSTOLIC BLOOD PRESSURE: 132 MMHG | WEIGHT: 175 LBS

## 2025-04-30 DIAGNOSIS — E29.1 HYPOGONADISM IN MALE: ICD-10-CM

## 2025-04-30 RX ORDER — TESTOSTERONE CYPIONATE 200 MG/ML
100 INJECTION, SOLUTION INTRAMUSCULAR WEEKLY
Qty: 2 ML | Refills: 0 | Status: SHIPPED | OUTPATIENT
Start: 2025-04-30

## 2025-04-30 NOTE — PROGRESS NOTES
Office Visit     Patient: Robbin Arteaga 44 y.o. male   : 1980   MRN: [id number]     Patient or patient representative verbalized consent for the use of Ambient Listening during the visit with  DEBBI Blackwell for chart documentation. 2025  16:22 EDT    Chief Complaint   Patient presents with    Hypogonadism     Referring Provider: No ref. provider found    Primary Care Provider: Rodney Smith DO     History of Present Illness  The patient presents for evaluation of testosterone management.    Testosterone injections  - Self-administers testosterone injections every Tuesday without complications.  - He changed injection day the week of labs to ensure labs were obtained 4 days after injection  - Reports occasional breast tenderness towards the end of the week, which does not persist.    Supplemental information: Currently on vitamin D supplementation.    MEDICATIONS  Vitamin D supplement.         Subjective   Review of System:   As noted in HPI.    Past Medical History:   Diagnosis Date    Hiatal hernia     Hormone disorder     Hypertension     Hypogonadism in male 2017    STD (sexually transmitted disease)      Past Surgical History:   Procedure Laterality Date    LEG SURGERY       Family History   Problem Relation Age of Onset    Cancer Father      Social History     Socioeconomic History    Marital status:    Tobacco Use    Smoking status: Former     Current packs/day: 0.00     Average packs/day: 0.5 packs/day for 5.0 years (2.5 ttl pk-yrs)     Types: Cigarettes     Start date: 2003     Quit date: 2008     Years since quittin.3     Passive exposure: Past    Smokeless tobacco: Never   Vaping Use    Vaping status: Never Used   Substance and Sexual Activity    Alcohol use: No    Drug use: No    Sexual activity: Yes     Partners: Female       Current Outpatient Medications:     Testosterone Cypionate (DEPOTESTOTERONE CYPIONATE) 200 MG/ML injection,  "Inject 0.5 mL into the appropriate muscle as directed by prescriber 1 (One) Time Per Week. Single use vial, waste remaining medicine, Disp: 2 mL, Rfl: 0    Alcohol Swabs pads, Clean area prior to injection, Disp: 100 each, Rfl: 11    nebivolol (BYSTOLIC) 10 MG tablet, Take 1 tablet by mouth Daily., Disp: 90 tablet, Rfl: 3    Needle, Disp, 18G X 1-1/2\" misc, Use for drawing up the medication, Disp: 50 each, Rfl: 3    Needle, Disp, 23G X 1-1/2\" misc, Use 1 Device 1 (One) Time Per Week., Disp: 30 each, Rfl: 11    SUBOXONE 8-2 MG film film, take 2 FILMs under the tongue once daily, Disp: , Rfl: 0    Syringe, Disposable, 3 ML misc, Use 1 syringe 1 (One) Time Per Week., Disp: 100 each, Rfl: 11    traZODone (DESYREL) 50 MG tablet, Take 1 tablet by mouth Every Night., Disp: 90 tablet, Rfl: 3    Vitamin D, Ergocalciferol, 82922 units capsule, Take 1 capsule by mouth 1 (One) Time Per Week., Disp: 8 capsule, Rfl: 3    Allergies   Allergen Reactions    Penicillins Other (See Comments)     Pt states it happened when he was a kid and he is unsure of the reaction      Objective   Visit Vitals  /76   Pulse 86   Temp 97.8 °F (36.6 °C) (Temporal)   Resp 20   Ht 170.2 cm (67.01\")   Wt 79.4 kg (175 lb)   SpO2 97%   BMI 27.40 kg/m²      Body mass index is 27.4 kg/m².     Physical Exam  Vitals and nursing note reviewed.   Constitutional:       General: He is awake. He is not in acute distress.     Appearance: He is not ill-appearing.   Pulmonary:      Effort: Pulmonary effort is normal.   Skin:     General: Skin is warm and dry.   Neurological:      Mental Status: He is alert and oriented to person, place, and time. Mental status is at baseline.   Psychiatric:         Mood and Affect: Mood normal.         Behavior: Behavior is cooperative.          Labs  Lab Results   Component Value Date    COLORU Yellow 06/30/2021    CLARITYU Clear 06/30/2021    SPECGRAV 1.020 06/30/2021    PHUR 7.0 06/30/2021    LEUKOCYTESUR Negative 06/30/2021 " "   NITRITE Negative 06/30/2021    PROTEINPOCUA Negative 06/30/2021    GLUCOSEUR Negative 06/30/2021    KETONESU Negative 06/30/2021    UROBILINOGEN Normal 06/30/2021    BILIRUBINUR Negative 06/30/2021    RBCUR Negative 06/30/2021      No results found for: \"WBCUA\", \"RBCUA\", \"BACTERIA\", \"HYALCASTU\", \"SQUAMEPIUA\"     Lab Results   Component Value Date    WBC 3.86 01/26/2023    HGB 16.1 04/01/2025    HCT 47.9 04/01/2025    MCV 92.3 01/26/2023     01/26/2023     Lab Results   Component Value Date    GLUCOSE 107 (H) 01/26/2023    CALCIUM 9.5 01/26/2023     01/26/2023    K 4.8 01/26/2023    CO2 30.5 (H) 01/26/2023    CL 99 01/26/2023    BUN 19 01/26/2023    BUN 15 01/11/2022    CREATININE 0.70 (L) 01/26/2023    CREATININE 0.72 (L) 01/11/2022    EGFR 119.5 01/26/2023    EGFR 129 12/14/2016    BCR 27.1 (H) 01/26/2023    ANIONGAP 13.1 07/23/2018    ALT 23 01/26/2023    AST 21 01/26/2023       Lab Results   Component Value Date    HGBA1C 5.3 06/16/2022        Lab Results   Component Value Date    PSA 0.383 03/27/2023       No results found for: \"URICACIDSTN\", \"OTXP7OFYCSH\", \"QGAT2WJHQZ\", \"LABMAGN\", \"CAPHOSSTONE\", \"HYDROXYAPATI\"  Lab Results   Component Value Date    RWUT07DU 22.9 (L) 01/11/2022     No results found for: \"CYSTINE\", \"URINEVOLUM\", \"CALCIUMUR\", \"OXALATEU\", \"CITRATEUR\", \"LABPH\", \"URICUR\", \"NAUR\", \"KUR\", \"MAGNESIUMUR\", \"PHOSUR\", \"AMMONIUMUR\", \"CLUR\", \"[id number]\", \"UREAUR\", \"LABCREAUR\", \"CAOXSAT\", \"PROTEINCATRT\"    Lab Results   Component Value Date    HCT 47.9 04/01/2025    HCT 45.9 12/16/2024    HCT 49.3 07/20/2024    HCT 47.0 04/03/2024     Lab Results   Component Value Date    HGB 16.1 04/01/2025    HGB 15.7 12/16/2024    HGB 16.5 07/20/2024    HGB 15.7 04/03/2024     Lab Results   Component Value Date    TESTOSTERONE 843.00 (H) 04/01/2025    TESTOSTERONE 1,163.00 (H) 12/16/2024    TESTOSTERONE 250.8 (L) 01/26/2023    TESTOSTERONE 878 12/14/2016    TESTOSTERONE 113 (A) 05/09/2016       Lab " Results   Component Value Date    TESTFRE 35.3 (H) 07/20/2024    TESTFRE 25.4 (H) 04/03/2024    TESTFRE 5.5 (L) 09/11/2023     Lab Results   Component Value Date    PSA 0.383 03/27/2023    ESTRADIOL 18.1 03/27/2023    LH 2.36 03/27/2023    PROLACTIN 6.86 03/27/2023       I have reviewed the above labs.      Assessment / Plan      Diagnoses and all orders for this visit:    1. Hypogonadism in male  -     Testosterone Cypionate (DEPOTESTOTERONE CYPIONATE) 200 MG/ML injection; Inject 0.5 mL into the appropriate muscle as directed by prescriber 1 (One) Time Per Week. Single use vial, waste remaining medicine  Dispense: 2 mL; Refill: 0  -     Hemoglobin & Hematocrit, Blood; Future  -     Testosterone; Future         Assessment & Plan  1. Testosterone management  - Testosterone levels within acceptable range (843 ng/dL)  - No signs of polycythemia; hematocrit levels satisfactory  - Occasional breast tenderness reported towards the end of the week, does not persist  - Prescription refill sent to [facility]  - Hemoglobin, hematocrit, and testosterone levels to be reassessed in 6 months    Follow-up  - Follow-up in 6 months with labs prior      Patient reviewed and signed zero tolerance policy, [facility] testosterone therapy policy, and FAQs of testosterone replacement therapy.  All questions were answered and patient agreed to follow policy.  Policies signed by patient and witnessed by me.  Patient was given signed copies for reference.       Return for f/u with Tete, labs prior.    Tete Gonsales, MSN, APRN, FNP-C  [facility]

## 2025-05-19 ENCOUNTER — PATIENT MESSAGE (OUTPATIENT)
Dept: UROLOGY | Facility: CLINIC | Age: 45
End: 2025-05-19
Payer: COMMERCIAL

## 2025-05-19 DIAGNOSIS — E29.1 HYPOGONADISM IN MALE: ICD-10-CM

## 2025-05-19 DIAGNOSIS — I10 ESSENTIAL HYPERTENSION: ICD-10-CM

## 2025-05-20 RX ORDER — TESTOSTERONE CYPIONATE 200 MG/ML
100 INJECTION, SOLUTION INTRAMUSCULAR WEEKLY
Qty: 2 ML | Refills: 0 | Status: SHIPPED | OUTPATIENT
Start: 2025-05-20

## 2025-05-20 RX ORDER — NEBIVOLOL 10 MG/1
10 TABLET ORAL DAILY
Qty: 90 TABLET | Refills: 3 | Status: SHIPPED | OUTPATIENT
Start: 2025-05-20

## 2025-05-20 NOTE — TELEPHONE ENCOUNTER
Rx Refill Note  Requested Prescriptions     Pending Prescriptions Disp Refills    nebivolol (BYSTOLIC) 10 MG tablet 90 tablet 3     Sig: Take 1 tablet by mouth Daily.      Last office visit with prescribing clinician: 2/6/2025   Last telemedicine visit with prescribing clinician: Visit date not found   Next office visit with prescribing clinician: 8/7/2025                         Would you like a call back once the refill request has been completed: [] Yes [] No    If the office needs to give you a call back, can they leave a voicemail: [] Yes [] No    Angeles Francse MA  05/20/25, 09:14 EDT

## 2025-07-30 ENCOUNTER — PATIENT MESSAGE (OUTPATIENT)
Dept: UROLOGY | Facility: CLINIC | Age: 45
End: 2025-07-30
Payer: COMMERCIAL

## 2025-07-30 DIAGNOSIS — E29.1 HYPOGONADISM IN MALE: ICD-10-CM

## 2025-08-01 RX ORDER — TESTOSTERONE CYPIONATE 200 MG/ML
100 INJECTION, SOLUTION INTRAMUSCULAR WEEKLY
Qty: 2 ML | Refills: 0 | Status: SHIPPED | OUTPATIENT
Start: 2025-08-01

## 2025-08-06 ENCOUNTER — TELEPHONE (OUTPATIENT)
Age: 45
End: 2025-08-06
Payer: COMMERCIAL